# Patient Record
Sex: FEMALE | Race: BLACK OR AFRICAN AMERICAN | NOT HISPANIC OR LATINO | URBAN - METROPOLITAN AREA
[De-identification: names, ages, dates, MRNs, and addresses within clinical notes are randomized per-mention and may not be internally consistent; named-entity substitution may affect disease eponyms.]

---

## 2017-05-15 ENCOUNTER — EMERGENCY (EMERGENCY)
Facility: HOSPITAL | Age: 37
LOS: 0 days | Discharge: HOME | End: 2017-05-15
Attending: EMERGENCY MEDICINE | Admitting: EMERGENCY MEDICINE
Payer: COMMERCIAL

## 2017-05-15 VITALS
SYSTOLIC BLOOD PRESSURE: 122 MMHG | OXYGEN SATURATION: 100 % | HEART RATE: 79 BPM | TEMPERATURE: 98 F | DIASTOLIC BLOOD PRESSURE: 85 MMHG | RESPIRATION RATE: 18 BRPM | WEIGHT: 119.93 LBS

## 2017-05-15 VITALS
HEART RATE: 80 BPM | OXYGEN SATURATION: 100 % | DIASTOLIC BLOOD PRESSURE: 70 MMHG | SYSTOLIC BLOOD PRESSURE: 110 MMHG | RESPIRATION RATE: 18 BRPM

## 2017-05-15 DIAGNOSIS — D17.23 BENIGN LIPOMATOUS NEOPLASM OF SKIN AND SUBCUTANEOUS TISSUE OF RIGHT LEG: ICD-10-CM

## 2017-05-15 DIAGNOSIS — M79.662 PAIN IN LEFT LOWER LEG: ICD-10-CM

## 2017-05-15 DIAGNOSIS — M79.669 PAIN IN UNSPECIFIED LOWER LEG: ICD-10-CM

## 2017-05-15 DIAGNOSIS — M79.1 MYALGIA: ICD-10-CM

## 2017-05-15 DIAGNOSIS — M79.661 PAIN IN RIGHT LOWER LEG: ICD-10-CM

## 2017-05-15 DIAGNOSIS — D57.1 SICKLE-CELL DISEASE WITHOUT CRISIS: ICD-10-CM

## 2017-05-15 PROCEDURE — 99283 EMERGENCY DEPT VISIT LOW MDM: CPT | Mod: 25

## 2017-05-15 PROCEDURE — 73590 X-RAY EXAM OF LOWER LEG: CPT | Mod: 26,RT

## 2017-05-15 PROCEDURE — 93970 EXTREMITY STUDY: CPT | Mod: 26

## 2017-05-15 RX ORDER — IBUPROFEN 200 MG
400 TABLET ORAL ONCE
Qty: 0 | Refills: 0 | Status: COMPLETED | OUTPATIENT
Start: 2017-05-15 | End: 2017-05-15

## 2017-05-15 RX ADMIN — Medication 400 MILLIGRAM(S): at 04:01

## 2017-05-15 NOTE — ED PROVIDER NOTE - MEDICAL DECISION MAKING DETAILS
Patient to follow up with PMD/sickle cell doctor and was given copies of her ultrasound.  If pain persists, she understands she will need additional testing.

## 2017-05-15 NOTE — ED PROVIDER NOTE - DETAILS:
I, Sarah Vasquez, performed the initial face to face bedside interview with this patient regarding history of present illness, review of symptoms and relevant past medical, social and family history.  I completed an independent physical examination.  I was the initial provider who evaluated this patient.  The history, relevant review of systems, past medical and surgical history, medical decision making, and physical examination was documented by the scribe in my presence and I attest to the accuracy of the documentation.

## 2017-05-15 NOTE — ED PROVIDER NOTE - NS ED MD SCRIBE ATTENDING SCRIBE SECTIONS
HISTORY OF PRESENT ILLNESS/DISPOSITION/RESULTS/PHYSICAL EXAM/PROGRESS NOTE/REVIEW OF SYSTEMS/PAST MEDICAL/SURGICAL/SOCIAL HISTORY/VITAL SIGNS( Pullset)

## 2017-05-15 NOTE — ED PROVIDER NOTE - MUSCULOSKELETAL, MLM
Spine appears normal, range of motion is not limited. +tenderness to medial side of proximal tibia. Calf is soft without any swelling or tenderness.

## 2017-05-15 NOTE — ED PROVIDER NOTE - OBJECTIVE STATEMENT
38 y/o female with PMHx of sickle cell disease presents to the ED c/o right calf pain x 1 days. Pain is to medial side of right calf and palpates a focal tender nodule. No discoloration or swelling. Denies any falls or trauma to legs. Usually wears supportive footwear daily. Has never had this pain in the asthma. No pain meds PTA. Came to ED to r/o blood clot.

## 2017-05-15 NOTE — ED PROVIDER NOTE - CARE PLAN
Principal Discharge DX:	Myalgia  Secondary Diagnosis:	Calf pain, unspecified laterality  Secondary Diagnosis:	Lipoma of right lower extremity

## 2017-05-15 NOTE — ED ADULT NURSE NOTE - OBJECTIVE STATEMENT
38 y/o female awake alert and oriented x4 presents to ED c/o right calf pain started 1 week ago. Denies cp,sob,ha,dz,n/v/d/fever/chills or urinary sx. pt describes as burning sensation worse with touching. A bump noted on Right calf painful to touch. On birth control. non-smoker. 38 y/o female awake alert and oriented x4 presents to ED c/o right calf pain started 1 week ago. Denies cp,sob,ha,dz,n/v/d/fever/chills or urinary sx. pt describes as burning sensation worse with touching. a focal tender nodule on Right calf noted with pain to touch. On birth control. non-smoker. No Pain medication pta.

## 2017-08-21 ENCOUNTER — EMERGENCY (EMERGENCY)
Facility: HOSPITAL | Age: 37
LOS: 0 days | Discharge: HOME | End: 2017-08-21
Attending: EMERGENCY MEDICINE | Admitting: EMERGENCY MEDICINE
Payer: COMMERCIAL

## 2017-08-21 VITALS
TEMPERATURE: 98 F | SYSTOLIC BLOOD PRESSURE: 107 MMHG | RESPIRATION RATE: 16 BRPM | HEART RATE: 76 BPM | DIASTOLIC BLOOD PRESSURE: 75 MMHG | OXYGEN SATURATION: 100 %

## 2017-08-21 VITALS — HEIGHT: 55 IN | WEIGHT: 119.93 LBS

## 2017-08-21 DIAGNOSIS — S92.534A NONDISPLACED FRACTURE OF DISTAL PHALANX OF RIGHT LESSER TOE(S), INITIAL ENCOUNTER FOR CLOSED FRACTURE: ICD-10-CM
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
## 2017-08-21 DIAGNOSIS — S92.591A OTHER FRACTURE OF RIGHT LESSER TOE(S), INITIAL ENCOUNTER FOR CLOSED FRACTURE: ICD-10-CM
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
## 2017-08-21 PROCEDURE — 99283 EMERGENCY DEPT VISIT LOW MDM: CPT

## 2017-08-21 PROCEDURE — 73660 X-RAY EXAM OF TOE(S): CPT | Mod: 26,RT

## 2017-08-21 NOTE — ED STATDOCS - CARE PLAN
Principal Discharge DX:	Closed nondisplaced fracture of middle phalanx of lesser toe of right foot, initial encounter

## 2017-08-21 NOTE — ED STATDOCS - MEDICAL DECISION MAKING DETAILS
38 yo female presents with right 5th toe pain since yesterday after trauma. Xray. r/o fracture. -Mirza Astorga PA-C

## 2017-08-21 NOTE — ED STATDOCS - OBJECTIVE STATEMENT
36 y/o F with PMHx of sickle cell disease presents to the ED c/o right toe pain. Pt hurt her toe yesterday when her toe struck against the bottom of the pool. Denies any numbness or tingling. Pt's last crisis with sickle cell was in 2000. Pt does not take any meds. Non smoker.

## 2017-08-21 NOTE — ED ADULT NURSE NOTE - OBJECTIVE STATEMENT
Pt is a 37y female, A & O x 3, VSS, presents to ED s/p right foot injury, pt states she was at a water park on Saturday and hit foot in pool, pt able to ambulate, + pulse, motor and sensation, +5th digit swelling, pt c/o tingling no obvious bleeding, deformity, pt in no apparent distress, will continue to monitor.

## 2017-08-21 NOTE — ED STATDOCS - PROGRESS NOTE DETAILS
36 yo female presents with right 5th toe pain s/p hitting toe against the bottom of a pool at a water park. Denies fever, numbness. -Mirza Astorga PA-C

## 2017-09-11 ENCOUNTER — APPOINTMENT (OUTPATIENT)
Dept: OBGYN | Facility: CLINIC | Age: 37
End: 2017-09-11
Payer: COMMERCIAL

## 2017-09-11 VITALS
SYSTOLIC BLOOD PRESSURE: 114 MMHG | WEIGHT: 116 LBS | HEIGHT: 60 IN | DIASTOLIC BLOOD PRESSURE: 73 MMHG | BODY MASS INDEX: 22.78 KG/M2 | HEART RATE: 101 BPM

## 2017-09-11 LAB
BILIRUB UR QL STRIP: NORMAL
GLUCOSE UR-MCNC: NORMAL
HCG UR QL: 0.2 EU/DL
HGB UR QL STRIP.AUTO: NORMAL
KETONES UR-MCNC: NORMAL
LEUKOCYTE ESTERASE UR QL STRIP: NORMAL
NITRITE UR QL STRIP: NORMAL
PH UR STRIP: 6.5
PROT UR STRIP-MCNC: NORMAL
SP GR UR STRIP: 1.01

## 2017-09-11 PROCEDURE — 81003 URINALYSIS AUTO W/O SCOPE: CPT | Mod: QW

## 2017-09-11 PROCEDURE — 99395 PREV VISIT EST AGE 18-39: CPT

## 2017-09-12 ENCOUNTER — APPOINTMENT (OUTPATIENT)
Dept: ORTHOPEDIC SURGERY | Facility: CLINIC | Age: 37
End: 2017-09-12
Payer: COMMERCIAL

## 2017-09-12 LAB — HPV HIGH+LOW RISK DNA PNL CVX: NEGATIVE

## 2017-09-15 ENCOUNTER — EMERGENCY (EMERGENCY)
Facility: HOSPITAL | Age: 37
LOS: 0 days | Discharge: HOME | End: 2017-09-15
Attending: EMERGENCY MEDICINE | Admitting: EMERGENCY MEDICINE
Payer: COMMERCIAL

## 2017-09-15 VITALS
DIASTOLIC BLOOD PRESSURE: 83 MMHG | RESPIRATION RATE: 15 BRPM | OXYGEN SATURATION: 100 % | TEMPERATURE: 99 F | SYSTOLIC BLOOD PRESSURE: 118 MMHG | WEIGHT: 149.91 LBS | HEIGHT: 64 IN | HEART RATE: 74 BPM

## 2017-09-15 VITALS
TEMPERATURE: 98 F | HEART RATE: 69 BPM | SYSTOLIC BLOOD PRESSURE: 126 MMHG | DIASTOLIC BLOOD PRESSURE: 73 MMHG | OXYGEN SATURATION: 99 % | RESPIRATION RATE: 16 BRPM

## 2017-09-15 DIAGNOSIS — D57.1 SICKLE-CELL DISEASE WITHOUT CRISIS: ICD-10-CM

## 2017-09-15 DIAGNOSIS — N39.0 URINARY TRACT INFECTION, SITE NOT SPECIFIED: ICD-10-CM

## 2017-09-15 LAB
APPEARANCE UR: (no result)
BACTERIA # UR AUTO: (no result)
BACTERIA UR CULT: ABNORMAL
BILIRUB UR-MCNC: NEGATIVE — SIGNIFICANT CHANGE UP
COLOR SPEC: YELLOW — SIGNIFICANT CHANGE UP
CYTOLOGY CVX/VAG DOC THIN PREP: NORMAL
DIFF PNL FLD: (no result)
EPI CELLS # UR: SIGNIFICANT CHANGE UP
GLUCOSE UR QL: NEGATIVE MG/DL — SIGNIFICANT CHANGE UP
KETONES UR-MCNC: NEGATIVE — SIGNIFICANT CHANGE UP
LEUKOCYTE ESTERASE UR-ACNC: (no result)
NITRITE UR-MCNC: NEGATIVE — SIGNIFICANT CHANGE UP
PH UR: 8 — SIGNIFICANT CHANGE UP (ref 5–8)
PROT UR-MCNC: NEGATIVE MG/DL — SIGNIFICANT CHANGE UP
RBC CASTS # UR COMP ASSIST: SIGNIFICANT CHANGE UP /HPF (ref 0–4)
SP GR SPEC: 1.01 — SIGNIFICANT CHANGE UP (ref 1.01–1.02)
UROBILINOGEN FLD QL: 4 MG/DL
WBC UR QL: SIGNIFICANT CHANGE UP

## 2017-09-15 PROCEDURE — 99283 EMERGENCY DEPT VISIT LOW MDM: CPT

## 2017-09-15 RX ORDER — CIPROFLOXACIN LACTATE 400MG/40ML
500 VIAL (ML) INTRAVENOUS ONCE
Qty: 0 | Refills: 0 | Status: COMPLETED | OUTPATIENT
Start: 2017-09-15 | End: 2017-09-15

## 2017-09-15 RX ORDER — NITROFURANTOIN MACROCRYSTALS 100 MG/1
100 CAPSULE ORAL
Qty: 14 | Refills: 0 | Status: DISCONTINUED | COMMUNITY
Start: 2017-09-14 | End: 2017-09-15

## 2017-09-15 RX ADMIN — Medication 500 MILLIGRAM(S): at 22:44

## 2017-09-15 NOTE — ED PROVIDER NOTE - OBJECTIVE STATEMENT
36 yo F with hx of sickle cell disease presents for evaluation of UTI symptoms. patient describes urinary frequency, urgency and dysuria x4 days. patient prescribed macrobid by PMD yesterday. first dose last night. this morning she describes headache which she believes is secondary to macrobid. PMD changed medication to cipro but she has not filled prescription yet. no fever or chills.

## 2017-09-15 NOTE — ED PROVIDER NOTE - GASTROINTESTINAL NEGATIVE STATEMENT, MLM
no abdominal pain, no bloating, no constipation, no diarrhea, no nausea and no vomiting. no CVA TTP.

## 2017-09-15 NOTE — ED ADULT NURSE NOTE - CHIEF COMPLAINT QUOTE
uti symptoms since last week. started on macrobid, worsening symptoms, gyn changed medication to cipro, pt requesting 2nd opinion before starting cipro.

## 2017-09-15 NOTE — ED ADULT NURSE NOTE - OBJECTIVE STATEMENT
Patient arrived to ED c/o urinary symptoms, dysuria with urgency and back pain. Given Macrobid, took a dose yesterday but had migraine headache, nausea and vomiting, contacted PCP, changed PO med to cipro but did not take med because wanted a second opinion. Hx of sickle cell.

## 2017-09-15 NOTE — ED PROVIDER NOTE - MEDICAL DECISION MAKING DETAILS
patient with history suggestive of UTI despite low WBC in urine. patient given dose of cipro in ED, she has a prescription for Cipro waiting for her at her pharmacy prescribed by her PMD. precautions when to return to the ED given and understood.

## 2017-09-17 LAB
CULTURE RESULTS: NO GROWTH — SIGNIFICANT CHANGE UP
SPECIMEN SOURCE: SIGNIFICANT CHANGE UP

## 2017-09-19 ENCOUNTER — APPOINTMENT (OUTPATIENT)
Dept: INTERNAL MEDICINE | Facility: CLINIC | Age: 37
End: 2017-09-19
Payer: COMMERCIAL

## 2017-09-19 VITALS
BODY MASS INDEX: 20.14 KG/M2 | HEART RATE: 94 BPM | TEMPERATURE: 98.7 F | SYSTOLIC BLOOD PRESSURE: 108 MMHG | DIASTOLIC BLOOD PRESSURE: 60 MMHG | OXYGEN SATURATION: 97 % | WEIGHT: 118 LBS | RESPIRATION RATE: 16 BRPM | HEIGHT: 64 IN

## 2017-09-19 DIAGNOSIS — Z82.49 FAMILY HISTORY OF ISCHEMIC HEART DISEASE AND OTHER DISEASES OF THE CIRCULATORY SYSTEM: ICD-10-CM

## 2017-09-19 PROCEDURE — 99204 OFFICE O/P NEW MOD 45 MIN: CPT

## 2017-09-20 ENCOUNTER — APPOINTMENT (OUTPATIENT)
Dept: ORTHOPEDIC SURGERY | Facility: CLINIC | Age: 37
End: 2017-09-20
Payer: COMMERCIAL

## 2017-09-20 DIAGNOSIS — M25.552 PAIN IN RIGHT HIP: ICD-10-CM

## 2017-09-20 DIAGNOSIS — M25.551 PAIN IN RIGHT HIP: ICD-10-CM

## 2017-09-20 PROCEDURE — 99204 OFFICE O/P NEW MOD 45 MIN: CPT | Mod: 57

## 2017-09-20 PROCEDURE — 73630 X-RAY EXAM OF FOOT: CPT | Mod: RT

## 2017-09-20 PROCEDURE — 72190 X-RAY EXAM OF PELVIS: CPT

## 2017-09-20 PROCEDURE — 28510 TREATMENT OF TOE FRACTURE: CPT | Mod: T9

## 2017-09-24 PROBLEM — M25.551 HIP PAIN, BILATERAL: Status: ACTIVE | Noted: 2017-09-19

## 2017-11-01 ENCOUNTER — APPOINTMENT (OUTPATIENT)
Dept: ORTHOPEDIC SURGERY | Facility: CLINIC | Age: 37
End: 2017-11-01
Payer: COMMERCIAL

## 2017-11-01 PROCEDURE — 73630 X-RAY EXAM OF FOOT: CPT | Mod: RT

## 2017-11-01 PROCEDURE — 99024 POSTOP FOLLOW-UP VISIT: CPT

## 2018-01-23 ENCOUNTER — OTHER (OUTPATIENT)
Age: 38
End: 2018-01-23

## 2018-01-23 LAB
CHOLEST SERPL-MCNC: 152
GLUCOSE BS SERPL-MCNC: 84
HBA1C MFR BLD HPLC: 4
HDLC SERPL-MCNC: 43
LDLC SERPL CALC-MCNC: 91

## 2018-02-09 ENCOUNTER — EMERGENCY (EMERGENCY)
Facility: HOSPITAL | Age: 38
LOS: 0 days | Discharge: HOME | End: 2018-02-10
Attending: EMERGENCY MEDICINE | Admitting: EMERGENCY MEDICINE
Payer: COMMERCIAL

## 2018-02-09 VITALS — HEIGHT: 64 IN | WEIGHT: 115.08 LBS

## 2018-02-09 DIAGNOSIS — J40 BRONCHITIS, NOT SPECIFIED AS ACUTE OR CHRONIC: ICD-10-CM

## 2018-02-09 DIAGNOSIS — R05 COUGH: ICD-10-CM

## 2018-02-09 DIAGNOSIS — D57.1 SICKLE-CELL DISEASE WITHOUT CRISIS: ICD-10-CM

## 2018-02-09 PROCEDURE — 99285 EMERGENCY DEPT VISIT HI MDM: CPT | Mod: 25

## 2018-02-09 NOTE — ED ADULT NURSE NOTE - OBJECTIVE STATEMENT
Pt states she has had cough with yellow sputum, fever up to 102 and general malaise since Wednesday.  States hx of sickle cell and recurrent PNA.  She has been taking advil for fever control.

## 2018-02-10 VITALS
RESPIRATION RATE: 17 BRPM | HEART RATE: 98 BPM | SYSTOLIC BLOOD PRESSURE: 107 MMHG | DIASTOLIC BLOOD PRESSURE: 60 MMHG | TEMPERATURE: 99 F | OXYGEN SATURATION: 100 %

## 2018-02-10 LAB
ALBUMIN SERPL ELPH-MCNC: 3.9 G/DL — SIGNIFICANT CHANGE UP (ref 3.3–5)
ALP SERPL-CCNC: 78 U/L — SIGNIFICANT CHANGE UP (ref 40–120)
ALT FLD-CCNC: 16 U/L — SIGNIFICANT CHANGE UP (ref 12–78)
ANION GAP SERPL CALC-SCNC: 5 MMOL/L — SIGNIFICANT CHANGE UP (ref 5–17)
AST SERPL-CCNC: 25 U/L — SIGNIFICANT CHANGE UP (ref 15–37)
BASOPHILS # BLD AUTO: 0.1 K/UL — SIGNIFICANT CHANGE UP (ref 0–0.2)
BASOPHILS NFR BLD AUTO: 1.1 % — SIGNIFICANT CHANGE UP (ref 0–2)
BILIRUB SERPL-MCNC: 1.4 MG/DL — HIGH (ref 0.2–1.2)
BLD GP AB SCN SERPL QL: SIGNIFICANT CHANGE UP
BUN SERPL-MCNC: 5 MG/DL — LOW (ref 7–23)
CALCIUM SERPL-MCNC: 8.8 MG/DL — SIGNIFICANT CHANGE UP (ref 8.5–10.1)
CHLORIDE SERPL-SCNC: 101 MMOL/L — SIGNIFICANT CHANGE UP (ref 96–108)
CO2 SERPL-SCNC: 30 MMOL/L — SIGNIFICANT CHANGE UP (ref 22–31)
CREAT SERPL-MCNC: 0.67 MG/DL — SIGNIFICANT CHANGE UP (ref 0.5–1.3)
EOSINOPHIL # BLD AUTO: 0 K/UL — SIGNIFICANT CHANGE UP (ref 0–0.5)
EOSINOPHIL NFR BLD AUTO: 0.4 % — SIGNIFICANT CHANGE UP (ref 0–6)
GLUCOSE SERPL-MCNC: 88 MG/DL — SIGNIFICANT CHANGE UP (ref 70–99)
HCG SERPL-ACNC: <1 MIU/ML — SIGNIFICANT CHANGE UP
HCT VFR BLD CALC: 31.7 % — LOW (ref 34.5–45)
HGB BLD-MCNC: 11.5 G/DL — SIGNIFICANT CHANGE UP (ref 11.5–15.5)
LYMPHOCYTES # BLD AUTO: 2.6 K/UL — SIGNIFICANT CHANGE UP (ref 1–3.3)
LYMPHOCYTES # BLD AUTO: 35.2 % — SIGNIFICANT CHANGE UP (ref 13–44)
MANUAL DIF COMMENT BLD-IMP: SIGNIFICANT CHANGE UP
MCHC RBC-ENTMCNC: 31.6 PG — SIGNIFICANT CHANGE UP (ref 27–34)
MCHC RBC-ENTMCNC: 36.3 GM/DL — HIGH (ref 32–36)
MCV RBC AUTO: 87 FL — SIGNIFICANT CHANGE UP (ref 80–100)
MONOCYTES # BLD AUTO: 0.6 K/UL — SIGNIFICANT CHANGE UP (ref 0–0.9)
MONOCYTES NFR BLD AUTO: 8.2 % — SIGNIFICANT CHANGE UP (ref 2–14)
NEUTROPHILS # BLD AUTO: 4.1 K/UL — SIGNIFICANT CHANGE UP (ref 1.8–7.4)
NEUTROPHILS NFR BLD AUTO: 55.1 % — SIGNIFICANT CHANGE UP (ref 43–77)
PLAT MORPH BLD: NORMAL — SIGNIFICANT CHANGE UP
PLATELET # BLD AUTO: 272 K/UL — SIGNIFICANT CHANGE UP (ref 150–400)
POTASSIUM SERPL-MCNC: 4 MMOL/L — SIGNIFICANT CHANGE UP (ref 3.5–5.3)
POTASSIUM SERPL-SCNC: 4 MMOL/L — SIGNIFICANT CHANGE UP (ref 3.5–5.3)
PROT SERPL-MCNC: 9.5 GM/DL — HIGH (ref 6–8.3)
RBC # BLD: 3.64 M/UL — LOW (ref 3.8–5.2)
RBC # BLD: 3.67 M/UL — LOW (ref 3.8–5.2)
RBC # FLD: 12.5 % — SIGNIFICANT CHANGE UP (ref 10.3–14.5)
RBC BLD AUTO: (no result)
RETICS #: 123.3 K/UL — SIGNIFICANT CHANGE UP (ref 25–125)
RETICS/RBC NFR: 3.4 % — HIGH (ref 0.5–2.5)
SODIUM SERPL-SCNC: 136 MMOL/L — SIGNIFICANT CHANGE UP (ref 135–145)
TARGETS BLD QL SMEAR: SLIGHT — SIGNIFICANT CHANGE UP
TROPONIN I SERPL-MCNC: <0.015 NG/ML — SIGNIFICANT CHANGE UP (ref 0.01–0.04)
TYPE + AB SCN PNL BLD: SIGNIFICANT CHANGE UP
WBC # BLD: 7.5 K/UL — SIGNIFICANT CHANGE UP (ref 3.8–10.5)
WBC # FLD AUTO: 7.5 K/UL — SIGNIFICANT CHANGE UP (ref 3.8–10.5)

## 2018-02-10 PROCEDURE — 71046 X-RAY EXAM CHEST 2 VIEWS: CPT | Mod: 26

## 2018-02-10 PROCEDURE — 93010 ELECTROCARDIOGRAM REPORT: CPT

## 2018-02-10 RX ORDER — ALBUTEROL 90 UG/1
1 AEROSOL, METERED ORAL
Qty: 1 | Refills: 0
Start: 2018-02-10 | End: 2018-02-14

## 2018-02-10 RX ORDER — ALBUTEROL 90 UG/1
2.5 AEROSOL, METERED ORAL ONCE
Qty: 0 | Refills: 0 | Status: COMPLETED | OUTPATIENT
Start: 2018-02-10 | End: 2018-02-10

## 2018-02-10 RX ORDER — SODIUM CHLORIDE 9 MG/ML
1000 INJECTION INTRAMUSCULAR; INTRAVENOUS; SUBCUTANEOUS ONCE
Qty: 0 | Refills: 0 | Status: COMPLETED | OUTPATIENT
Start: 2018-02-10 | End: 2018-02-10

## 2018-02-10 RX ADMIN — SODIUM CHLORIDE 2000 MILLILITER(S): 9 INJECTION INTRAMUSCULAR; INTRAVENOUS; SUBCUTANEOUS at 00:05

## 2018-02-10 RX ADMIN — ALBUTEROL 2.5 MILLIGRAM(S): 90 AEROSOL, METERED ORAL at 00:20

## 2018-02-10 NOTE — ED PROVIDER NOTE - CARE PLAN
Principal Discharge DX:	Bronchitis Principal Discharge DX:	Bronchitis  Secondary Diagnosis:	Sickle cell disease

## 2018-02-10 NOTE — ED PROVIDER NOTE - MEDICAL DECISION MAKING DETAILS
feeling better ny hypoxemia will tx vioral bronchitis return to ed for intracble chest pain sob or any overall worsning

## 2018-02-10 NOTE — ED PROVIDER NOTE - OBJECTIVE STATEMENT
pt presents with non productive cough myalgia and subjective fever. + h/o SCD. denies fever. denies HA or neck pain. no chest pain + sob. no abd pain. no n/v/d. no urinary f/u/d. no back pain. no motor or sensory deficits. denies illicit drug use. no recent travel. no rash. no other acute issues symptoms or concerns . no leg swelling no hemoptysis

## 2018-02-12 NOTE — ED POST DISCHARGE NOTE - REASON FOR FOLLOW-UP
Other Contacted patient by phone with results of lab work (reticulocyte percent). patient will F/U with PCP. Bright HANDLEY

## 2018-03-06 ENCOUNTER — APPOINTMENT (OUTPATIENT)
Dept: INTERNAL MEDICINE | Facility: CLINIC | Age: 38
End: 2018-03-06
Payer: COMMERCIAL

## 2018-03-06 VITALS
WEIGHT: 112 LBS | BODY MASS INDEX: 19.12 KG/M2 | HEIGHT: 64 IN | OXYGEN SATURATION: 97 % | RESPIRATION RATE: 16 BRPM | DIASTOLIC BLOOD PRESSURE: 62 MMHG | HEART RATE: 92 BPM | TEMPERATURE: 98.8 F | SYSTOLIC BLOOD PRESSURE: 104 MMHG

## 2018-03-06 DIAGNOSIS — M87.059 IDIOPATHIC ASEPTIC NECROSIS OF UNSPECIFIED FEMUR: ICD-10-CM

## 2018-03-06 DIAGNOSIS — M15.9 POLYOSTEOARTHRITIS, UNSPECIFIED: ICD-10-CM

## 2018-03-06 PROCEDURE — 99214 OFFICE O/P EST MOD 30 MIN: CPT

## 2018-03-06 RX ORDER — VIT A AND D3 IN COD LIVER OIL 1250-135
CAPSULE ORAL
Refills: 0 | Status: ACTIVE | COMMUNITY

## 2018-04-26 ENCOUNTER — APPOINTMENT (OUTPATIENT)
Dept: INTERNAL MEDICINE | Facility: CLINIC | Age: 38
End: 2018-04-26
Payer: COMMERCIAL

## 2018-04-26 VITALS
OXYGEN SATURATION: 95 % | SYSTOLIC BLOOD PRESSURE: 106 MMHG | HEART RATE: 88 BPM | RESPIRATION RATE: 14 BRPM | WEIGHT: 112 LBS | TEMPERATURE: 98.4 F | DIASTOLIC BLOOD PRESSURE: 70 MMHG | BODY MASS INDEX: 21.99 KG/M2 | HEIGHT: 60 IN

## 2018-04-26 PROCEDURE — 81002 URINALYSIS NONAUTO W/O SCOPE: CPT

## 2018-04-26 PROCEDURE — 99213 OFFICE O/P EST LOW 20 MIN: CPT | Mod: 25

## 2018-04-30 LAB
APPEARANCE: CLEAR
BACTERIA UR CULT: ABNORMAL
BACTERIA: NEGATIVE
BILIRUBIN URINE: NEGATIVE
BLOOD URINE: NEGATIVE
COLOR: YELLOW
GLUCOSE QUALITATIVE U: NEGATIVE MG/DL
HYALINE CASTS: 0 /LPF
KETONES URINE: NEGATIVE
LEUKOCYTE ESTERASE URINE: ABNORMAL
MICROSCOPIC-UA: NORMAL
NITRITE URINE: NEGATIVE
PH URINE: 8
PROTEIN URINE: 30 MG/DL
RED BLOOD CELLS URINE: 4 /HPF
SPECIFIC GRAVITY URINE: 1.01
SQUAMOUS EPITHELIAL CELLS: 2 /HPF
UROBILINOGEN URINE: 1 MG/DL
WHITE BLOOD CELLS URINE: 128 /HPF

## 2018-09-04 ENCOUNTER — APPOINTMENT (OUTPATIENT)
Dept: INTERNAL MEDICINE | Facility: CLINIC | Age: 38
End: 2018-09-04

## 2018-09-13 ENCOUNTER — RX RENEWAL (OUTPATIENT)
Age: 38
End: 2018-09-13

## 2018-09-14 PROBLEM — D57.1 SICKLE-CELL DISEASE WITHOUT CRISIS: Chronic | Status: ACTIVE | Noted: 2017-05-15

## 2018-09-28 ENCOUNTER — APPOINTMENT (OUTPATIENT)
Dept: OBGYN | Facility: CLINIC | Age: 38
End: 2018-09-28
Payer: COMMERCIAL

## 2018-09-28 VITALS
WEIGHT: 115.5 LBS | BODY MASS INDEX: 22.68 KG/M2 | SYSTOLIC BLOOD PRESSURE: 115 MMHG | HEART RATE: 83 BPM | HEIGHT: 60 IN | DIASTOLIC BLOOD PRESSURE: 77 MMHG

## 2018-09-28 DIAGNOSIS — Z87.898 PERSONAL HISTORY OF OTHER SPECIFIED CONDITIONS: ICD-10-CM

## 2018-09-28 DIAGNOSIS — S92.911A UNSPECIFIED FRACTURE OF RIGHT TOE(S), INITIAL ENCOUNTER FOR CLOSED FRACTURE: ICD-10-CM

## 2018-09-28 PROCEDURE — 99396 PREV VISIT EST AGE 40-64: CPT

## 2018-09-28 RX ORDER — CIPROFLOXACIN HYDROCHLORIDE 250 MG/1
250 TABLET, FILM COATED ORAL
Qty: 14 | Refills: 0 | Status: COMPLETED | COMMUNITY
Start: 2018-04-26 | End: 2018-09-28

## 2018-09-30 LAB — HPV HIGH+LOW RISK DNA PNL CVX: NOT DETECTED

## 2018-10-03 LAB — CYTOLOGY CVX/VAG DOC THIN PREP: NORMAL

## 2019-03-05 NOTE — ED PROVIDER NOTE - NS ED NOTE AC HIGH RISK COUNTRIES
Left message informing patient she is due for fasting labs.  Advised to fast for 12 hours.  Informed patient she can visit any [facility] labs to have labs completed; if planning to come to our office for labs to call and schedule lab appt.  Lab orders extended 2 weeks.   
No

## 2019-05-08 NOTE — ED ADULT TRIAGE NOTE - CHIEF COMPLAINT QUOTE
syncope at work uti symptoms since last week. started on macrobid, worsening symptoms, gyn changed medication to cipro, pt requesting 2nd opinion before starting cipro.

## 2019-06-16 ENCOUNTER — FORM ENCOUNTER (OUTPATIENT)
Age: 39
End: 2019-06-16

## 2019-06-17 ENCOUNTER — OUTPATIENT (OUTPATIENT)
Dept: OUTPATIENT SERVICES | Facility: HOSPITAL | Age: 39
LOS: 1 days | End: 2019-06-17
Payer: COMMERCIAL

## 2019-06-17 ENCOUNTER — APPOINTMENT (OUTPATIENT)
Dept: RADIOLOGY | Facility: CLINIC | Age: 39
End: 2019-06-17
Payer: COMMERCIAL

## 2019-06-17 ENCOUNTER — APPOINTMENT (OUTPATIENT)
Dept: INTERNAL MEDICINE | Facility: CLINIC | Age: 39
End: 2019-06-17
Payer: COMMERCIAL

## 2019-06-17 ENCOUNTER — NON-APPOINTMENT (OUTPATIENT)
Age: 39
End: 2019-06-17

## 2019-06-17 VITALS
HEIGHT: 64 IN | BODY MASS INDEX: 19.29 KG/M2 | TEMPERATURE: 98.8 F | HEART RATE: 70 BPM | SYSTOLIC BLOOD PRESSURE: 96 MMHG | WEIGHT: 113 LBS | OXYGEN SATURATION: 97 % | RESPIRATION RATE: 18 BRPM | DIASTOLIC BLOOD PRESSURE: 64 MMHG

## 2019-06-17 DIAGNOSIS — Z01.419 ENCOUNTER FOR GYNECOLOGICAL EXAMINATION (GENERAL) (ROUTINE) W/OUT ABNORMAL FINDINGS: ICD-10-CM

## 2019-06-17 DIAGNOSIS — B35.4 TINEA CORPORIS: ICD-10-CM

## 2019-06-17 DIAGNOSIS — R07.9 CHEST PAIN, UNSPECIFIED: ICD-10-CM

## 2019-06-17 DIAGNOSIS — D57.1 SICKLE-CELL DISEASE W/OUT CRISIS: ICD-10-CM

## 2019-06-17 DIAGNOSIS — Z00.8 ENCOUNTER FOR OTHER GENERAL EXAMINATION: ICD-10-CM

## 2019-06-17 PROCEDURE — 93000 ELECTROCARDIOGRAM COMPLETE: CPT

## 2019-06-17 PROCEDURE — 99214 OFFICE O/P EST MOD 30 MIN: CPT | Mod: 25

## 2019-06-17 PROCEDURE — 71046 X-RAY EXAM CHEST 2 VIEWS: CPT

## 2019-06-17 PROCEDURE — 71046 X-RAY EXAM CHEST 2 VIEWS: CPT | Mod: 26

## 2019-06-17 RX ORDER — MULTIVITAMIN
CAPSULE ORAL
Refills: 0 | Status: COMPLETED | COMMUNITY
End: 2019-06-17

## 2019-06-17 NOTE — ASSESSMENT
[FreeTextEntry1] : CRXY PA/LAT stat , [facility] r/o infiltrate\par start Levaquin 500 mg po daily x 7 days\par Stressed to pt to Maintain hydration , h/ of sickle cell  \par OTC analgesia for aches, pain\par F/up , hematology  \par pt given note for work excuse \par To ER if symptoms worsen

## 2019-06-17 NOTE — REVIEW OF SYSTEMS
[Negative] : Psychiatric [Fatigue] : fatigue [Chest Pain] : chest pain [Cough] : cough [Fever] : no fever [Wheezing] : no wheezing [Chills] : no chills [Dyspnea on Exertion] : no dyspnea on exertion [FreeTextEntry6] : see HPI

## 2019-06-17 NOTE — HISTORY OF PRESENT ILLNESS
[FreeTextEntry8] : Pt presents  to the office today with complaints of  pleuritic chest pain and green colored sputum x 4 days. This is  associated with an occasional cough . She denies fever, chills, shortness of breath or wheezing. She is a non smoker, ho history of asthma. \par She has a history of sickle cell anemia and sees Dr. Garcia. hematology at [facility] . She reports her last sickle cell crisis was in 2000 requiring hospitalization. \par

## 2019-06-17 NOTE — PHYSICAL EXAM
[No Acute Distress] : no acute distress [Well Nourished] : well nourished [Well Developed] : well developed [Normal Oropharynx] : the oropharynx was normal [Normal TMs] : both tympanic membranes were normal [Supple] : supple [No Lymphadenopathy] : no lymphadenopathy [No Respiratory Distress] : no respiratory distress  [Clear to Auscultation] : lungs were clear to auscultation bilaterally [No Accessory Muscle Use] : no accessory muscle use [Normal Rate] : normal rate  [Regular Rhythm] : with a regular rhythm [Normal S1, S2] : normal S1 and S2 [Normal Posterior Cervical Nodes] : no posterior cervical lymphadenopathy [Normal Anterior Cervical Nodes] : no anterior cervical lymphadenopathy [Normal Gait] : normal gait [No Focal Deficits] : no focal deficits [Coordination Grossly Intact] : coordination grossly intact [Normal Affect] : the affect was normal [Alert and Oriented x3] : oriented to person, place, and time [Normal Insight/Judgement] : insight and judgment were intact

## 2019-08-26 ENCOUNTER — APPOINTMENT (OUTPATIENT)
Dept: INTERNAL MEDICINE | Facility: CLINIC | Age: 39
End: 2019-08-26
Payer: COMMERCIAL

## 2019-08-26 ENCOUNTER — INPATIENT (INPATIENT)
Facility: HOSPITAL | Age: 39
LOS: 2 days | Discharge: HOME | DRG: 871 | End: 2019-08-29
Attending: FAMILY MEDICINE | Admitting: HOSPITALIST
Payer: COMMERCIAL

## 2019-08-26 VITALS
HEART RATE: 122 BPM | SYSTOLIC BLOOD PRESSURE: 90 MMHG | TEMPERATURE: 100.7 F | HEIGHT: 64 IN | DIASTOLIC BLOOD PRESSURE: 60 MMHG | OXYGEN SATURATION: 98 % | WEIGHT: 112 LBS | BODY MASS INDEX: 19.12 KG/M2 | RESPIRATION RATE: 18 BRPM

## 2019-08-26 VITALS — WEIGHT: 115.08 LBS

## 2019-08-26 DIAGNOSIS — R39.9 UNSPECIFIED SYMPTOMS AND SIGNS INVOLVING THE GENITOURINARY SYSTEM: ICD-10-CM

## 2019-08-26 DIAGNOSIS — R10.9 UNSPECIFIED ABDOMINAL PAIN: ICD-10-CM

## 2019-08-26 LAB
ALBUMIN SERPL ELPH-MCNC: 3.9 G/DL — SIGNIFICANT CHANGE UP (ref 3.3–5)
ALP SERPL-CCNC: 72 U/L — SIGNIFICANT CHANGE UP (ref 40–120)
ALT FLD-CCNC: 16 U/L — SIGNIFICANT CHANGE UP (ref 12–78)
ANION GAP SERPL CALC-SCNC: 6 MMOL/L — SIGNIFICANT CHANGE UP (ref 5–17)
APPEARANCE UR: ABNORMAL
APTT BLD: 29.1 SEC — SIGNIFICANT CHANGE UP (ref 27.5–36.3)
AST SERPL-CCNC: 17 U/L — SIGNIFICANT CHANGE UP (ref 15–37)
BASOPHILS # BLD AUTO: 0.06 K/UL — SIGNIFICANT CHANGE UP (ref 0–0.2)
BASOPHILS NFR BLD AUTO: 0.4 % — SIGNIFICANT CHANGE UP (ref 0–2)
BILIRUB SERPL-MCNC: 2 MG/DL — HIGH (ref 0.2–1.2)
BILIRUB UR QL STRIP: NEGATIVE
BILIRUB UR-MCNC: NEGATIVE — SIGNIFICANT CHANGE UP
BUN SERPL-MCNC: 9 MG/DL — SIGNIFICANT CHANGE UP (ref 7–23)
CALCIUM SERPL-MCNC: 9.5 MG/DL — SIGNIFICANT CHANGE UP (ref 8.5–10.1)
CHLORIDE SERPL-SCNC: 104 MMOL/L — SIGNIFICANT CHANGE UP (ref 96–108)
CLARITY UR: NORMAL
CO2 SERPL-SCNC: 28 MMOL/L — SIGNIFICANT CHANGE UP (ref 22–31)
COLOR SPEC: YELLOW — SIGNIFICANT CHANGE UP
CREAT SERPL-MCNC: 0.81 MG/DL — SIGNIFICANT CHANGE UP (ref 0.5–1.3)
DIFF PNL FLD: ABNORMAL
EOSINOPHIL # BLD AUTO: 0.03 K/UL — SIGNIFICANT CHANGE UP (ref 0–0.5)
EOSINOPHIL NFR BLD AUTO: 0.2 % — SIGNIFICANT CHANGE UP (ref 0–6)
GLUCOSE SERPL-MCNC: 106 MG/DL — HIGH (ref 70–99)
GLUCOSE UR QL: NEGATIVE MG/DL — SIGNIFICANT CHANGE UP
GLUCOSE UR-MCNC: NEGATIVE
HCG SERPL-ACNC: <1 MIU/ML — SIGNIFICANT CHANGE UP
HCG UR QL: 2 EU/DL
HCT VFR BLD CALC: 29.9 % — LOW (ref 34.5–45)
HGB BLD-MCNC: 11 G/DL — LOW (ref 11.5–15.5)
HGB UR QL STRIP.AUTO: NORMAL
IMM GRANULOCYTES NFR BLD AUTO: 0.6 % — SIGNIFICANT CHANGE UP (ref 0–1.5)
INR BLD: 1.25 RATIO — HIGH (ref 0.88–1.16)
KETONES UR-MCNC: NEGATIVE
KETONES UR-MCNC: NEGATIVE — SIGNIFICANT CHANGE UP
LACTATE SERPL-SCNC: 2.7 MMOL/L — HIGH (ref 0.7–2)
LEUKOCYTE ESTERASE UR QL STRIP: NORMAL
LEUKOCYTE ESTERASE UR-ACNC: ABNORMAL
LYMPHOCYTES # BLD AUTO: 16.9 % — SIGNIFICANT CHANGE UP (ref 13–44)
LYMPHOCYTES # BLD AUTO: 2.35 K/UL — SIGNIFICANT CHANGE UP (ref 1–3.3)
MCHC RBC-ENTMCNC: 31 PG — SIGNIFICANT CHANGE UP (ref 27–34)
MCHC RBC-ENTMCNC: 36.8 GM/DL — HIGH (ref 32–36)
MCV RBC AUTO: 84.2 FL — SIGNIFICANT CHANGE UP (ref 80–100)
MONOCYTES # BLD AUTO: 0.89 K/UL — SIGNIFICANT CHANGE UP (ref 0–0.9)
MONOCYTES NFR BLD AUTO: 6.4 % — SIGNIFICANT CHANGE UP (ref 2–14)
NEUTROPHILS # BLD AUTO: 10.48 K/UL — HIGH (ref 1.8–7.4)
NEUTROPHILS NFR BLD AUTO: 75.5 % — SIGNIFICANT CHANGE UP (ref 43–77)
NITRITE UR QL STRIP: NEGATIVE
NITRITE UR-MCNC: NEGATIVE — SIGNIFICANT CHANGE UP
PH UR STRIP: 8.5
PH UR: 7 — SIGNIFICANT CHANGE UP (ref 5–8)
PLATELET # BLD AUTO: 288 K/UL — SIGNIFICANT CHANGE UP (ref 150–400)
POTASSIUM SERPL-MCNC: 3.6 MMOL/L — SIGNIFICANT CHANGE UP (ref 3.5–5.3)
POTASSIUM SERPL-SCNC: 3.6 MMOL/L — SIGNIFICANT CHANGE UP (ref 3.5–5.3)
PROT SERPL-MCNC: 8.7 GM/DL — HIGH (ref 6–8.3)
PROT UR STRIP-MCNC: NORMAL
PROT UR-MCNC: 100 MG/DL
PROTHROM AB SERPL-ACNC: 14 SEC — HIGH (ref 10–12.9)
RBC # BLD: 3.55 M/UL — LOW (ref 3.8–5.2)
RBC # FLD: 12.8 % — SIGNIFICANT CHANGE UP (ref 10.3–14.5)
SODIUM SERPL-SCNC: 138 MMOL/L — SIGNIFICANT CHANGE UP (ref 135–145)
SP GR SPEC: 1 — LOW (ref 1.01–1.02)
SP GR UR STRIP: 1.01
UROBILINOGEN FLD QL: 4 MG/DL
WBC # BLD: 13.89 K/UL — HIGH (ref 3.8–10.5)
WBC # FLD AUTO: 13.89 K/UL — HIGH (ref 3.8–10.5)

## 2019-08-26 PROCEDURE — 71045 X-RAY EXAM CHEST 1 VIEW: CPT | Mod: 26

## 2019-08-26 PROCEDURE — 81003 URINALYSIS AUTO W/O SCOPE: CPT | Mod: QW

## 2019-08-26 PROCEDURE — 99213 OFFICE O/P EST LOW 20 MIN: CPT

## 2019-08-26 PROCEDURE — 74176 CT ABD & PELVIS W/O CONTRAST: CPT | Mod: 26

## 2019-08-26 PROCEDURE — 93010 ELECTROCARDIOGRAM REPORT: CPT

## 2019-08-26 RX ORDER — CEFTRIAXONE 500 MG/1
1000 INJECTION, POWDER, FOR SOLUTION INTRAMUSCULAR; INTRAVENOUS ONCE
Refills: 0 | Status: DISCONTINUED | OUTPATIENT
Start: 2019-08-26 | End: 2019-08-26

## 2019-08-26 RX ORDER — MORPHINE SULFATE 50 MG/1
4 CAPSULE, EXTENDED RELEASE ORAL ONCE
Refills: 0 | Status: DISCONTINUED | OUTPATIENT
Start: 2019-08-26 | End: 2019-08-26

## 2019-08-26 RX ORDER — PSYLLIUM HUSK 0.4 G
CAPSULE ORAL
Refills: 0 | Status: COMPLETED | COMMUNITY
End: 2019-08-26

## 2019-08-26 RX ORDER — ACETAMINOPHEN 500 MG
1000 TABLET ORAL ONCE
Refills: 0 | Status: COMPLETED | OUTPATIENT
Start: 2019-08-26 | End: 2019-08-26

## 2019-08-26 RX ORDER — SODIUM CHLORIDE 9 MG/ML
1000 INJECTION INTRAMUSCULAR; INTRAVENOUS; SUBCUTANEOUS ONCE
Refills: 0 | Status: COMPLETED | OUTPATIENT
Start: 2019-08-26 | End: 2019-08-26

## 2019-08-26 RX ORDER — CEFTRIAXONE 500 MG/1
1000 INJECTION, POWDER, FOR SOLUTION INTRAMUSCULAR; INTRAVENOUS ONCE
Refills: 0 | Status: COMPLETED | OUTPATIENT
Start: 2019-08-26 | End: 2019-08-26

## 2019-08-26 RX ORDER — LEVOFLOXACIN 500 MG/1
500 TABLET, FILM COATED ORAL DAILY
Qty: 7 | Refills: 0 | Status: COMPLETED | COMMUNITY
Start: 2019-06-17 | End: 2019-08-26

## 2019-08-26 RX ORDER — SODIUM CHLORIDE 9 MG/ML
1600 INJECTION, SOLUTION INTRAVENOUS ONCE
Refills: 0 | Status: COMPLETED | OUTPATIENT
Start: 2019-08-26 | End: 2019-08-26

## 2019-08-26 RX ORDER — NYSTATIN 100000 [USP'U]/G
100000 CREAM TOPICAL TWICE DAILY
Qty: 1 | Refills: 0 | Status: COMPLETED | COMMUNITY
Start: 2019-06-17 | End: 2019-08-26

## 2019-08-26 RX ADMIN — SODIUM CHLORIDE 1000 MILLILITER(S): 9 INJECTION INTRAMUSCULAR; INTRAVENOUS; SUBCUTANEOUS at 23:22

## 2019-08-26 RX ADMIN — Medication 1000 MILLIGRAM(S): at 21:38

## 2019-08-26 RX ADMIN — SODIUM CHLORIDE 1600 MILLILITER(S): 9 INJECTION, SOLUTION INTRAVENOUS at 22:39

## 2019-08-26 RX ADMIN — MORPHINE SULFATE 4 MILLIGRAM(S): 50 CAPSULE, EXTENDED RELEASE ORAL at 21:38

## 2019-08-26 RX ADMIN — CEFTRIAXONE 1000 MILLIGRAM(S): 500 INJECTION, POWDER, FOR SOLUTION INTRAMUSCULAR; INTRAVENOUS at 21:39

## 2019-08-26 RX ADMIN — SODIUM CHLORIDE 1600 MILLILITER(S): 9 INJECTION, SOLUTION INTRAVENOUS at 21:37

## 2019-08-26 NOTE — ED ADULT NURSE REASSESSMENT NOTE - NS ED NURSE REASSESS COMMENT FT1
pt received. alert and oriented. states improved symptoms. awaiting repeat lactate after fluid bolus. will monitor.

## 2019-08-26 NOTE — HISTORY OF PRESENT ILLNESS
[FreeTextEntry8] : Pt presents  to the office today with complaints of left flank pain , fever and dysuria. She has a history of sickle cell anemia , and sees DR. Berumen , hematology at [facility]. She reports urinary frequency and dysuria 2 -3 days ago. This am she woke up and had chills and fever 100.8 F . She also complains of left flank pain extending to left pelvic region. It is worse with movement. She appears  visibility in pain , states sometimes sharp and constant.  \par Urine dip in office revealed + blood, + leukocytes. She denies chest pain, palpitations, nausea, vomiting, palpitations. \par

## 2019-08-26 NOTE — ED PROVIDER NOTE - CLINICAL SUMMARY MEDICAL DECISION MAKING FREE TEXT BOX
40 yo female with a PMH of OA, SC presents with 2-3 ays of dysuria and frequency and noticed today L flank pain. Labs, UA, CT, Reeval. -Mirza Astorga PA-C

## 2019-08-26 NOTE — ED ADULT NURSE NOTE - OBJECTIVE STATEMENT
c/o left flank pain with burning on urination & frequency with fever for 3 days. Saw PMD today who recommended to ER for further evaluation

## 2019-08-26 NOTE — PHYSICAL EXAM
[Well Nourished] : well nourished [No Lymphadenopathy] : no lymphadenopathy [Supple] : supple [No Respiratory Distress] : no respiratory distress  [Clear to Auscultation] : lungs were clear to auscultation bilaterally [No Accessory Muscle Use] : no accessory muscle use [Regular Rhythm] : with a regular rhythm [Normal Rate] : normal rate  [Soft] : abdomen soft [Normal S1, S2] : normal S1 and S2 [Non-distended] : non-distended [Normal Posterior Cervical Nodes] : no posterior cervical lymphadenopathy [No Focal Deficits] : no focal deficits [Coordination Grossly Intact] : coordination grossly intact [Normal Anterior Cervical Nodes] : no anterior cervical lymphadenopathy [Normal Gait] : normal gait [Normal Affect] : the affect was normal [Alert and Oriented x3] : oriented to person, place, and time [Normal Insight/Judgement] : insight and judgment were intact [de-identified] : Pt appears uncomfortable  [de-identified] : + [de-identified] : + left  CVA tenderness

## 2019-08-26 NOTE — REVIEW OF SYSTEMS
[Fever] : fever [Chills] : chills [Hematuria] : hematuria [Dysuria] : dysuria [Frequency] : frequency [Negative] : Neurological [FreeTextEntry8] : see HPI

## 2019-08-26 NOTE — ED STATDOCS - PROGRESS NOTE DETAILS
Lauro AS for Dr. Veras: 38 y/o female with a PMHx of Sickle Cell Disease presents to the ED c/o dysuria, left flank pain x 2 days. Pt states that she was seen by her PCP today, had her urine checked which showed blood in her urine. Pt was then told to come to the ED. Will send pt to main ED for further evaluation.

## 2019-08-26 NOTE — ED PROVIDER NOTE - CARE PLAN
Principal Discharge DX:	Pyelonephritis  Secondary Diagnosis:	Hypotension, unspecified hypotension type

## 2019-08-26 NOTE — ED PROVIDER NOTE - OBJECTIVE STATEMENT
40 yo female with a PMH of SC disease and OA of the hips presents with frequency of urination and dysuria x 2-3 days and today noticed L flank pain. +sub fever at home. Went to her PMD and had a UA which showed blood and bacteria in her urine and sent to the ER.

## 2019-08-26 NOTE — ED ADULT TRIAGE NOTE - CHIEF COMPLAINT QUOTE
pt c.o painful urinationf or 3 days and left flank pain with fever since today. pt seen by PCP and told she has hematuria and abcteria in her urine. pt has sickle cell anemia

## 2019-08-26 NOTE — ASSESSMENT
[FreeTextEntry1] : Possible kidney stone vs pyonephritis, \par Sent to ER for urology  evaluation,  stat  imaging/ renal sono \par Reports given to Triage Alicja CANALES\par F/up with pt post discharge

## 2019-08-27 DIAGNOSIS — Z98.891 HISTORY OF UTERINE SCAR FROM PREVIOUS SURGERY: Chronic | ICD-10-CM

## 2019-08-27 DIAGNOSIS — I95.9 HYPOTENSION, UNSPECIFIED: ICD-10-CM

## 2019-08-27 DIAGNOSIS — N12 TUBULO-INTERSTITIAL NEPHRITIS, NOT SPECIFIED AS ACUTE OR CHRONIC: ICD-10-CM

## 2019-08-27 DIAGNOSIS — Z98.890 OTHER SPECIFIED POSTPROCEDURAL STATES: Chronic | ICD-10-CM

## 2019-08-27 LAB
ADD ON TEST-SPECIMEN IN LAB: SIGNIFICANT CHANGE UP
ALBUMIN SERPL ELPH-MCNC: 2.8 G/DL — LOW (ref 3.3–5)
ALP SERPL-CCNC: 52 U/L — SIGNIFICANT CHANGE UP (ref 40–120)
ALT FLD-CCNC: 10 U/L — LOW (ref 12–78)
ANION GAP SERPL CALC-SCNC: 6 MMOL/L — SIGNIFICANT CHANGE UP (ref 5–17)
APTT BLD: 31.5 SEC — SIGNIFICANT CHANGE UP (ref 27.5–36.3)
AST SERPL-CCNC: 13 U/L — LOW (ref 15–37)
BASOPHILS # BLD AUTO: 0.04 K/UL — SIGNIFICANT CHANGE UP (ref 0–0.2)
BASOPHILS NFR BLD AUTO: 0.3 % — SIGNIFICANT CHANGE UP (ref 0–2)
BILIRUB SERPL-MCNC: 1.9 MG/DL — HIGH (ref 0.2–1.2)
BUN SERPL-MCNC: 6 MG/DL — LOW (ref 7–23)
CALCIUM SERPL-MCNC: 7.4 MG/DL — LOW (ref 8.5–10.1)
CHLORIDE SERPL-SCNC: 111 MMOL/L — HIGH (ref 96–108)
CO2 SERPL-SCNC: 23 MMOL/L — SIGNIFICANT CHANGE UP (ref 22–31)
CREAT SERPL-MCNC: 0.58 MG/DL — SIGNIFICANT CHANGE UP (ref 0.5–1.3)
E COLI DNA BLD POS QL NAA+NON-PROBE: SIGNIFICANT CHANGE UP
EOSINOPHIL # BLD AUTO: 0.02 K/UL — SIGNIFICANT CHANGE UP (ref 0–0.5)
EOSINOPHIL NFR BLD AUTO: 0.2 % — SIGNIFICANT CHANGE UP (ref 0–6)
FOLATE SERPL-MCNC: 19.1 NG/ML — SIGNIFICANT CHANGE UP
GLUCOSE SERPL-MCNC: 97 MG/DL — SIGNIFICANT CHANGE UP (ref 70–99)
GRAM STN FLD: SIGNIFICANT CHANGE UP
GRAM STN FLD: SIGNIFICANT CHANGE UP
HCT VFR BLD CALC: 21.9 % — LOW (ref 34.5–45)
HCT VFR BLD CALC: SIGNIFICANT CHANGE UP (ref 34.5–45)
HGB BLD-MCNC: 8.3 G/DL — LOW (ref 11.5–15.5)
HGB BLD-MCNC: 8.8 G/DL — LOW (ref 11.5–15.5)
IMM GRANULOCYTES NFR BLD AUTO: 0.5 % — SIGNIFICANT CHANGE UP (ref 0–1.5)
INR BLD: 1.41 RATIO — HIGH (ref 0.88–1.16)
LACTATE SERPL-SCNC: 0.7 MMOL/L — SIGNIFICANT CHANGE UP (ref 0.7–2)
LYMPHOCYTES # BLD AUTO: 1.65 K/UL — SIGNIFICANT CHANGE UP (ref 1–3.3)
LYMPHOCYTES # BLD AUTO: 12.9 % — LOW (ref 13–44)
MAGNESIUM SERPL-MCNC: 1.8 MG/DL — SIGNIFICANT CHANGE UP (ref 1.6–2.6)
MCHC RBC-ENTMCNC: 31.8 PG — SIGNIFICANT CHANGE UP (ref 27–34)
MCHC RBC-ENTMCNC: 37.9 GM/DL — HIGH (ref 32–36)
MCHC RBC-ENTMCNC: SIGNIFICANT CHANGE UP (ref 27–34)
MCHC RBC-ENTMCNC: SIGNIFICANT CHANGE UP (ref 32–36)
MCV RBC AUTO: 83.9 FL — SIGNIFICANT CHANGE UP (ref 80–100)
MCV RBC AUTO: SIGNIFICANT CHANGE UP (ref 80–100)
METHOD TYPE: SIGNIFICANT CHANGE UP
MONOCYTES # BLD AUTO: 0.85 K/UL — SIGNIFICANT CHANGE UP (ref 0–0.9)
MONOCYTES NFR BLD AUTO: 6.7 % — SIGNIFICANT CHANGE UP (ref 2–14)
NEUTROPHILS # BLD AUTO: 10.14 K/UL — HIGH (ref 1.8–7.4)
NEUTROPHILS NFR BLD AUTO: 79.4 % — HIGH (ref 43–77)
PHOSPHATE SERPL-MCNC: 2.5 MG/DL — SIGNIFICANT CHANGE UP (ref 2.5–4.5)
PLATELET # BLD AUTO: 196 K/UL — SIGNIFICANT CHANGE UP (ref 150–400)
PLATELET # BLD AUTO: 197 K/UL — SIGNIFICANT CHANGE UP (ref 150–400)
POTASSIUM SERPL-MCNC: 3.4 MMOL/L — LOW (ref 3.5–5.3)
POTASSIUM SERPL-SCNC: 3.4 MMOL/L — LOW (ref 3.5–5.3)
PROT SERPL-MCNC: 6.4 GM/DL — SIGNIFICANT CHANGE UP (ref 6–8.3)
PROTHROM AB SERPL-ACNC: 15.8 SEC — HIGH (ref 10–12.9)
RBC # BLD: 2.61 M/UL — LOW (ref 3.8–5.2)
RBC # BLD: 2.61 M/UL — LOW (ref 3.8–5.2)
RBC # BLD: SIGNIFICANT CHANGE UP M/UL (ref 3.8–5.2)
RBC # FLD: 12.7 % — SIGNIFICANT CHANGE UP (ref 10.3–14.5)
RBC # FLD: SIGNIFICANT CHANGE UP (ref 10.3–14.5)
RETICS #: 127.1 K/UL — HIGH (ref 25–125)
RETICS/RBC NFR: 4.9 % — HIGH (ref 0.5–2.5)
SODIUM SERPL-SCNC: 140 MMOL/L — SIGNIFICANT CHANGE UP (ref 135–145)
SPECIMEN SOURCE: SIGNIFICANT CHANGE UP
SPECIMEN SOURCE: SIGNIFICANT CHANGE UP
VIT B12 SERPL-MCNC: <150 PG/ML — LOW (ref 232–1245)
WBC # BLD: 10.6 K/UL — HIGH (ref 3.8–10.5)
WBC # BLD: 12.77 K/UL — HIGH (ref 3.8–10.5)
WBC # FLD AUTO: 10.6 K/UL — HIGH (ref 3.8–10.5)
WBC # FLD AUTO: 12.77 K/UL — HIGH (ref 3.8–10.5)

## 2019-08-27 PROCEDURE — 76705 ECHO EXAM OF ABDOMEN: CPT

## 2019-08-27 PROCEDURE — 85025 COMPLETE CBC W/AUTO DIFF WBC: CPT

## 2019-08-27 PROCEDURE — 87040 BLOOD CULTURE FOR BACTERIA: CPT

## 2019-08-27 PROCEDURE — 36415 COLL VENOUS BLD VENIPUNCTURE: CPT

## 2019-08-27 PROCEDURE — 82248 BILIRUBIN DIRECT: CPT

## 2019-08-27 PROCEDURE — 84100 ASSAY OF PHOSPHORUS: CPT

## 2019-08-27 PROCEDURE — 76705 ECHO EXAM OF ABDOMEN: CPT | Mod: 26

## 2019-08-27 PROCEDURE — 85610 PROTHROMBIN TIME: CPT

## 2019-08-27 PROCEDURE — 83735 ASSAY OF MAGNESIUM: CPT

## 2019-08-27 PROCEDURE — 85045 AUTOMATED RETICULOCYTE COUNT: CPT

## 2019-08-27 PROCEDURE — 80053 COMPREHEN METABOLIC PANEL: CPT

## 2019-08-27 PROCEDURE — 82746 ASSAY OF FOLIC ACID SERUM: CPT

## 2019-08-27 PROCEDURE — 93010 ELECTROCARDIOGRAM REPORT: CPT

## 2019-08-27 PROCEDURE — 85027 COMPLETE CBC AUTOMATED: CPT

## 2019-08-27 PROCEDURE — 85730 THROMBOPLASTIN TIME PARTIAL: CPT

## 2019-08-27 PROCEDURE — 93005 ELECTROCARDIOGRAM TRACING: CPT

## 2019-08-27 PROCEDURE — 82607 VITAMIN B-12: CPT

## 2019-08-27 PROCEDURE — 82247 BILIRUBIN TOTAL: CPT

## 2019-08-27 RX ORDER — ENOXAPARIN SODIUM 100 MG/ML
30 INJECTION SUBCUTANEOUS DAILY
Refills: 0 | Status: DISCONTINUED | OUTPATIENT
Start: 2019-08-27 | End: 2019-08-29

## 2019-08-27 RX ORDER — SODIUM CHLORIDE 9 MG/ML
1000 INJECTION INTRAMUSCULAR; INTRAVENOUS; SUBCUTANEOUS ONCE
Refills: 0 | Status: COMPLETED | OUTPATIENT
Start: 2019-08-27 | End: 2019-08-27

## 2019-08-27 RX ORDER — MORPHINE SULFATE 50 MG/1
2 CAPSULE, EXTENDED RELEASE ORAL EVERY 4 HOURS
Refills: 0 | Status: DISCONTINUED | OUTPATIENT
Start: 2019-08-27 | End: 2019-08-29

## 2019-08-27 RX ORDER — HEPARIN SODIUM 5000 [USP'U]/ML
5000 INJECTION INTRAVENOUS; SUBCUTANEOUS EVERY 8 HOURS
Refills: 0 | Status: DISCONTINUED | OUTPATIENT
Start: 2019-08-27 | End: 2019-08-27

## 2019-08-27 RX ORDER — PREGABALIN 225 MG/1
1000 CAPSULE ORAL DAILY
Refills: 0 | Status: DISCONTINUED | OUTPATIENT
Start: 2019-08-27 | End: 2019-08-29

## 2019-08-27 RX ORDER — POTASSIUM CHLORIDE 20 MEQ
40 PACKET (EA) ORAL ONCE
Refills: 0 | Status: COMPLETED | OUTPATIENT
Start: 2019-08-27 | End: 2019-08-27

## 2019-08-27 RX ORDER — OXYCODONE AND ACETAMINOPHEN 5; 325 MG/1; MG/1
1 TABLET ORAL EVERY 4 HOURS
Refills: 0 | Status: DISCONTINUED | OUTPATIENT
Start: 2019-08-27 | End: 2019-08-29

## 2019-08-27 RX ORDER — OMEGA-3 ACID ETHYL ESTERS 1 G
0 CAPSULE ORAL
Qty: 0 | Refills: 0 | DISCHARGE

## 2019-08-27 RX ORDER — DOCUSATE SODIUM 100 MG
100 CAPSULE ORAL THREE TIMES A DAY
Refills: 0 | Status: DISCONTINUED | OUTPATIENT
Start: 2019-08-27 | End: 2019-08-29

## 2019-08-27 RX ORDER — SODIUM CHLORIDE 9 MG/ML
1000 INJECTION INTRAMUSCULAR; INTRAVENOUS; SUBCUTANEOUS
Refills: 0 | Status: DISCONTINUED | OUTPATIENT
Start: 2019-08-27 | End: 2019-08-28

## 2019-08-27 RX ORDER — SODIUM CHLORIDE 9 MG/ML
1000 INJECTION INTRAMUSCULAR; INTRAVENOUS; SUBCUTANEOUS
Refills: 0 | Status: DISCONTINUED | OUTPATIENT
Start: 2019-08-27 | End: 2019-08-27

## 2019-08-27 RX ORDER — CEFTRIAXONE 500 MG/1
2000 INJECTION, POWDER, FOR SOLUTION INTRAMUSCULAR; INTRAVENOUS EVERY 24 HOURS
Refills: 0 | Status: DISCONTINUED | OUTPATIENT
Start: 2019-08-27 | End: 2019-08-28

## 2019-08-27 RX ORDER — OMEGA-3 ACID ETHYL ESTERS 1 G
4 CAPSULE ORAL DAILY
Refills: 0 | Status: DISCONTINUED | OUTPATIENT
Start: 2019-08-27 | End: 2019-08-29

## 2019-08-27 RX ORDER — PREGABALIN 225 MG/1
1 CAPSULE ORAL
Qty: 0 | Refills: 0 | DISCHARGE

## 2019-08-27 RX ORDER — ACETAMINOPHEN 500 MG
650 TABLET ORAL EVERY 4 HOURS
Refills: 0 | Status: DISCONTINUED | OUTPATIENT
Start: 2019-08-27 | End: 2019-08-29

## 2019-08-27 RX ORDER — LACTOBACILLUS ACIDOPHILUS 100MM CELL
1 CAPSULE ORAL
Refills: 0 | Status: DISCONTINUED | OUTPATIENT
Start: 2019-08-27 | End: 2019-08-29

## 2019-08-27 RX ORDER — CEFTRIAXONE 500 MG/1
1000 INJECTION, POWDER, FOR SOLUTION INTRAMUSCULAR; INTRAVENOUS EVERY 24 HOURS
Refills: 0 | Status: DISCONTINUED | OUTPATIENT
Start: 2019-08-27 | End: 2019-08-27

## 2019-08-27 RX ORDER — ONDANSETRON 8 MG/1
4 TABLET, FILM COATED ORAL EVERY 6 HOURS
Refills: 0 | Status: DISCONTINUED | OUTPATIENT
Start: 2019-08-27 | End: 2019-08-29

## 2019-08-27 RX ADMIN — SODIUM CHLORIDE 125 MILLILITER(S): 9 INJECTION INTRAMUSCULAR; INTRAVENOUS; SUBCUTANEOUS at 00:44

## 2019-08-27 RX ADMIN — CEFTRIAXONE 2000 MILLIGRAM(S): 500 INJECTION, POWDER, FOR SOLUTION INTRAMUSCULAR; INTRAVENOUS at 14:51

## 2019-08-27 RX ADMIN — Medication 1 TABLET(S): at 05:43

## 2019-08-27 RX ADMIN — HEPARIN SODIUM 5000 UNIT(S): 5000 INJECTION INTRAVENOUS; SUBCUTANEOUS at 05:42

## 2019-08-27 RX ADMIN — SODIUM CHLORIDE 1000 MILLILITER(S): 9 INJECTION INTRAMUSCULAR; INTRAVENOUS; SUBCUTANEOUS at 01:33

## 2019-08-27 RX ADMIN — MORPHINE SULFATE 2 MILLIGRAM(S): 50 CAPSULE, EXTENDED RELEASE ORAL at 01:41

## 2019-08-27 RX ADMIN — Medication 1 TABLET(S): at 18:37

## 2019-08-27 RX ADMIN — Medication 1 TABLET(S): at 11:06

## 2019-08-27 RX ADMIN — Medication 650 MILLIGRAM(S): at 03:33

## 2019-08-27 RX ADMIN — Medication 4 GRAM(S): at 11:06

## 2019-08-27 RX ADMIN — Medication 40 MILLIEQUIVALENT(S): at 14:50

## 2019-08-27 RX ADMIN — ONDANSETRON 4 MILLIGRAM(S): 8 TABLET, FILM COATED ORAL at 01:41

## 2019-08-27 RX ADMIN — Medication 650 MILLIGRAM(S): at 04:30

## 2019-08-27 RX ADMIN — Medication 650 MILLIGRAM(S): at 12:25

## 2019-08-27 RX ADMIN — ENOXAPARIN SODIUM 30 MILLIGRAM(S): 100 INJECTION SUBCUTANEOUS at 18:37

## 2019-08-27 RX ADMIN — SODIUM CHLORIDE 1000 MILLILITER(S): 9 INJECTION INTRAMUSCULAR; INTRAVENOUS; SUBCUTANEOUS at 03:35

## 2019-08-27 RX ADMIN — PREGABALIN 1000 MICROGRAM(S): 225 CAPSULE ORAL at 11:06

## 2019-08-27 RX ADMIN — MORPHINE SULFATE 2 MILLIGRAM(S): 50 CAPSULE, EXTENDED RELEASE ORAL at 02:35

## 2019-08-27 RX ADMIN — SODIUM CHLORIDE 1000 MILLILITER(S): 9 INJECTION INTRAMUSCULAR; INTRAVENOUS; SUBCUTANEOUS at 07:32

## 2019-08-27 NOTE — CHART NOTE - NSCHARTNOTEFT_GEN_A_CORE
Was called at 6:45 am for low blood pressure    Patient noted to have low blood pressures, to 80/45. She was status post 5 L fluids  I was going to admit her to ICU for pressor support, but she asked that we recheck her BP sitting up and standing up.   Her BP sitting down was 98/60 and 108/60 standing up  She had no crackles or O2 requirement. Recommended giving another 1 L fluid, and have ICU reassess afterwards

## 2019-08-27 NOTE — PATIENT PROFILE ADULT - FALLEN IN THE PAST
Medical Necessity


Medical necessity: [facility] M100 COPD: 55 yo w/ hx COPD w/ recent multiple 

hospitalizations presents in acute resp fx and acute COPD exacerbation. Meets 

MCG IP criteria for worsening COPD w/ acute resp fx w/ hypoxemia requiring O2 

to maintain sats>90%.  Steroids and nebs started.  Hx COPD, chronic hep B and C

, PTSD, chronic pain, homelessness, smoker.
no

## 2019-08-27 NOTE — H&P ADULT - NSICDXPASTMEDICALHX_GEN_ALL_CORE_FT
PAST MEDICAL HISTORY:  Acute UTI with sepsis 8/27/19    Gallstone     Hepatomegaly     Osteoarthritis of both hips, unspecified osteoarthritis type with h/o osteonecrosis of b/l hips at 6 months old    Renal cyst     Sickle cell disease

## 2019-08-27 NOTE — CONSULT NOTE ADULT - ASSESSMENT
Pt is a 48 yo female with a pmh/o sickle cell disease (last crisis many years ago and managed on fish oil/mvn/b12), Osteonecrosis of b/l hips at 6 mo old now with chronic OA, cholelithiasis, hepatomegaly, who presents sent from PMD due to UTI. Pt states she noted 3 days ago that her urine was dark, had urgency and increased frequency, and 8/26 began to have left flank pain that radiates to left lower abd/back area, aggravated by movement, alleviated by pain medication, fluctuates in intensity, 10/10 on presentation, pressure like, associated with fever, weakness. In ED pt noted to be febrile, hypotensive, with elevated lactate and +U/A, CT abd/pelvis no stones/hydro was given IV rocephin.     1. GNR sepsis/pyelonephritis/cystitis  - blood cx gnrs suspect Ecoli  - f/u urine cx f/u speciation/sensitivities of blood cx  - imaging reviewed  - increase rocephin to [id number]   - monitor temps  - tolerating abx well so far; no side effects noted  - reason for abx use and side effects reviewed with patient  - supportive care  - fu cbc    2. other issues - care per medicine

## 2019-08-27 NOTE — CHART NOTE - NSCHARTNOTEFT_GEN_A_CORE
Got called by RN that patient's BP was low. Assessed patient at bedside, without significant complaints other than mild but improving left flank pain. Left and right arm BPs both ~80/45 lying down. Patient's temp was 99.2 F rectally and HR ~ 80. Notified by RN that patient has only made 250 cc of urine in the last 8 hours, despite receiving 5L IVFs since admission. ICU was called to further evaluate.  While ICU was evaluating, patient's BP improved from 98/60 lying to 108/60 standing. ICU recommended another 1L bolus and to re-evaluate. Care to be transferred to day hospitalist.    Aryles Hedjar, MD  Night Hospitalist

## 2019-08-27 NOTE — PROVIDER CONTACT NOTE (OTHER) - SITUATION
Pt BP 91/56. HR 84. Temperature 100.3 after 1L bolus. Pt asymptomatic. Pt admitted for flank pain and UTI. Dr. Hein made aware. 1L bolus NS ordered STAT and ordered to administer Tylenol 650 STAT.

## 2019-08-27 NOTE — CONSULT NOTE ADULT - SUBJECTIVE AND OBJECTIVE BOX
Patient is a 39y old  Female who presents with a chief complaint of Pyelonephritis secondary to UTI complicated by sepsis (27 Aug 2019 01:14)    HPI:  Pt is a 48 yo female with a pmh/o sickle cell disease (last crisis many years ago and managed on fish oil/mvn/b12), Osteonecrosis of b/l hips at 6 mo old now with chronic OA, cholelithiasis, hepatomegaly, who presents sent from PMD due to UTI. Pt states she noted 3 days ago that her urine was dark, had urgency and increased frequency, and  began to have left flank pain that radiates to left lower abd/back area, aggravated by movement, alleviated by pain medication, fluctuates in intensity, 10/10 on presentation, pressure like, associated with fever, weakness. In ED pt noted to be febrile, hypotensive, with elevated lactate and +U/A, CT abd/pelvis no stones/hydro was given IV rocephin.       PMH: as above  PSH: as above  Meds: per reconciliation sheet, noted below  MEDICATIONS  (STANDING):  cefTRIAXone Injectable. 1000 milliGRAM(s) IV Push every 24 hours  cyanocobalamin 1000 MICROGram(s) Oral daily  heparin  Injectable 5000 Unit(s) SubCutaneous every 8 hours  lactobacillus acidophilus 1 Tablet(s) Oral two times a day  multivitamin 1 Tablet(s) Oral daily  omega-3-Acid Ethyl Esters 4 Gram(s) Oral daily  sodium chloride 0.9%. 1000 milliLiter(s) (125 mL/Hr) IV Continuous <Continuous>  sodium chloride 0.9%. 1000 milliLiter(s) (125 mL/Hr) IV Continuous <Continuous>      Allergies    No Known Drug Allergies  shellfish (Unknown)    Intolerances      Social: no smoking, no alcohol, no illegal drugs; no recent travel, no exposure to TB  FAMILY HISTORY:  Family history of high cholesterol: in mother, alive  Family history of hypertension in father: and parkinsons and CHF,      no history of premature cardiovascular disease in first degree relatives    ROS: no HA, no dizziness, no sore throat, no blurry vision, no CP, no palpitations, no SOB, no cough,  no diarrhea, no leg pain, no claudication, no rash, no joint aches, no rectal pain or bleeding, no night sweats  All other systems reviewed and are negative    Vital Signs Last 24 Hrs  T(C): 37.3 (27 Aug 2019 11:11), Max: 37.9 (27 Aug 2019 03:06)  T(F): 99.2 (27 Aug 2019 11:11), Max: 100.3 (27 Aug 2019 03:06)  HR: 89 (27 Aug 2019 11:11) (73 - 109)  BP: 90/55 (27 Aug 2019 11:11) (83/50 - 111/60)  BP(mean): --  RR: 18 (27 Aug 2019 11:11) (16 - 20)  SpO2: 100% (27 Aug 2019 11:11) (97% - 100%)  Daily         PE:  Constitutional: frail looking  HEENT: NC/AT, EOMI, PERRLA, conjunctivae clear; ears and nose atraumatic; pharynx benign  Neck: supple; thyroid not palpable  Back: no tenderness  Respiratory: respiratory effort normal; clear to auscultation  Cardiovascular: S1S2 regular, no murmurs  Abdomen: soft, not tender, not distended, positive BS  Genitourinary: R CVA tenderness  Lymphatic: no LN palpable  Musculoskeletal: no muscle tenderness, no joint swelling or tenderness  Extremities: no pedal edema  Neurological/ Psychiatric:  moving all extremities  Skin: no rashes; no palpable lesions    Labs: all available labs reviewed                        8.3    12.77 )-----------( 196      ( 27 Aug 2019 06:36 )             21.9         140  |  111<H>  |  6<L>  ----------------------------<  97  3.4<L>   |  23  |  0.58    Ca    7.4<L>      27 Aug 2019 06:36  Phos  2.5       Mg     1.8         TPro  6.4  /  Alb  2.8<L>  /  TBili  1.9<H>  /  DBili  x   /  AST  13<L>  /  ALT  10<L>  /  AlkPhos  52       LIVER FUNCTIONS - ( 27 Aug 2019 06:36 )  Alb: 2.8 g/dL / Pro: 6.4 gm/dL / ALK PHOS: 52 U/L / ALT: 10 U/L / AST: 13 U/L / GGT: x           Urinalysis Basic - ( 26 Aug 2019 21:27 )    Color: Yellow / Appearance: Slightly Turbid / S.005 / pH: x  Gluc: x / Ketone: Negative  / Bili: Negative / Urobili: 4 mg/dL   Blood: x / Protein: 100 mg/dL / Nitrite: Negative   Leuk Esterase: Moderate / RBC: >50 /HPF / WBC >50   Sq Epi: x / Non Sq Epi: Moderate / Bacteria: Moderate    Culture - Blood (19 @ 21:27)    Gram Stain:   Growth in anaerobic bottle: Gram Negative Rods    Specimen Source: .Blood None    Culture Results:   Growth in anaerobic bottle: Gram Negative Rods  "Due to technical problems, Proteus sp. will Not be reported as part of  the BCID panel until further notice"  ***Blood Panel PCR results on this specimen are available  approximately 3 hours after the Gram stain result.***  Gram stain, PCR, and/or culture results may not always  correspond due to difference in methodologies.  ************************************************************  This PCR assay was performed using [employer].  The following targets are tested for: Enterococcus,  vancomycin resistant enterococci, Listeria monocytogenes,  coagulase negative staphylococci, S. aureus,  methicillin resistant S. aureus, Streptococcus agalactiae  (Group B), S. pneumoniae, S. pyogenes (Group A),  Acinetobacter baumannii, Enterobacter cloacae, E. coli,  Klebsiella oxytoca, K. pneumoniae, Proteus sp.,  Serratia marcescens, Haemophilus influenzae,  Neisseria meningitidis, Pseudomonas aeruginosa, Candida  albicans, C. glabrata, C krusei, C parapsilosis,  C. tropicalis and the KPC resistance gene.          Radiology: all available radiological tests reviewed      EXAM:  CT ABDOMEN AND PELVIS                            PROCEDURE DATE:  2019          INTERPRETATION:  .    CLINICAL INFORMATION: Left-sided flank pain.    TECHNIQUE: Helical axial images were obtained from the domes of the   diaphragm through the pubic symphysis without the administration of IV or   oral contrast. Sagittal and coronal reformatted images were obtained from   the source data.    COMPARISON: Most recent prior CT examination of the abdomen and pelvis   from 2015.     FINDINGS: Evaluation of the heart, vascular structures, and   intra-abdominal organs is limited without the administration of IV   contrast. The ventricular chambers appear slightly hypodense when   compared to the interventricular septum, for which anemia can be   considered.  The imaged portions of the descending aorta and branch   arterial vessels appear unremarkable.    There is mild interlobular septal thickening at the lung bases. No   pleural effusions are visualized.    The unenhanced appearance to the spleen, pancreas, and adrenal glands   appear unremarkable.    Hepatomegaly is notable which appears unchanged.    Cholelithiasis is noted.    Bilateral renal cysts are notable. There is no hydroureteronephrosis   bilaterally. The urinary bladder appears within normal limits.    There are multiple scattered nonspecific subcentimeter retroperitoneal   and mesenteric lymph nodes.    There is no bowel obstruction or abdominal ascites. Gas and stool are   visualized throughout the large bowel loops. The appendix is normal.    The uterus and bilateral adnexa appear unremarkable.    There is severe osteoarthritis of the bilateral hips with subchondral   sclerosis, subchondral cystic change and flattening of the bilateral   femoral heads.Superimposed AVN is a possibility. The iliac bones appear   dysmorphic. Overall findings appear unchanged when compared to the prior   exam.    There is a leftward convex curvature to the thoracolumbar spine.    IMPRESSION:    1. No obstructive urolithiasis or hydronephrosis.    2. Cholelithiasis.    3. Similar-appearing severe bilateral hip osteoarthritis.      Advanced directives addressed: full resuscitation

## 2019-08-27 NOTE — H&P ADULT - ASSESSMENT
46 yo female with a pmh/o sickle cell disease (last crisis many years ago and managed on fish oil/mvn/b12), Osteonecrosis of b/l hips at 6 mo old now with chronic OA, cholelithiasis, hepatomegaly, who presents sent from PMD due to UTI, admitted for clinical pyelonephritis complicated by sepsis and concerning for underlying acute on chronic sickle cell crisis.     1) Clinical pyelonephritis due to UTI complicated by sepsis  Qsofa 1 46 yo female with a pmh/o sickle cell disease (last crisis many years ago and managed on fish oil/mvn/b12), Osteonecrosis of b/l hips at 6 mo old now with chronic OA, cholelithiasis, hepatomegaly, who presents sent from PMD due to UTI, admitted for clinical pyelonephritis complicated by sepsis and concerning for underlying acute on chronic sickle cell crisis.     1) Clinical pyelonephritis due to UTI complicated by severe sepsis  Admit to telemetry due to labile BP, consider downgrade in AM if VSS  Additional bolus ordered  Qsofa 1  ID consulted  Blood culture  U/A and culture  Chest x ray wnl  EKG reviewed, sinus tachycardia  Pain control with bowel regimen  lactate now <2  monitor lactate-> if >2 give additional bolus and f/u rpt s/p IVF  administer 30cc/kg bolus in increments of 500cc with evaluation of fluid status prior to subsequent bolus administration  monitor vitals closely  strict i/o's, external sanchez applied   Rocephin 1 g q d ordered  CT scan neg for abcess/stranding/hydronephrosis  CODE STATUS: FULL    2) Sickle cell disease  Pt with elevated bili and INR and mild scleral icterus noted on exam  concern for beginning of crisis- obtain reticulocyte count  cont MVN, B12, omega 3 in place of fish oil (patient may take own fish oil)    3) Osteonecrosis and OA of b/l hips  Chronic  pain at baseline  No further intervention  pain control    4) Incidental findings on CT scan  Renal cysts d/w pt, f/u as outpt  gallstones-known, no RUQ pain, no s/s acute olvin  Anemia- chronic, pt with Sickle cell disease  Mild intralobular thickening- f/u with PMD, no resp sx

## 2019-08-27 NOTE — H&P ADULT - HISTORY OF PRESENT ILLNESS
Pt is a 48 yo female with a pmh/o sickle cell disease (last crisis many years ago and managed on fish oil/mvn/b12), Osteonecrosis of b/l hips at 6 mo old now with chronic OA, cholelithiasis, hepatomegaly, who presents sent from PMD due to UTI. Pt states she noted 3 days ago that her urine was dark, had urgency and increased frequency, and today began to have left flank pain that radiates to left lower abd/back area, aggravated by movement, alleviated by pain medication, fluctuates in intensity, 10/10 on presentation, pressure like, associated with fever, weakness. In ED pt noted to be febrile, hypotensive, with elevated lactate and +U/A.     Denies cp, palpitations, increased abd girth, abd pain, n/v/d, jaundice, HA, rash, bleeding, sob.

## 2019-08-27 NOTE — H&P ADULT - ATTENDING COMMENTS
16 min of time spent discussing advanced care planning including code status, existence of health care proxy. Pt is FULL CODE at this time. Proxy is her  Cody Lassiter and can be reached at [phone]. Pt in agreement with above, all questions answered and concerns addressed.

## 2019-08-28 LAB
-  AMIKACIN: SIGNIFICANT CHANGE UP
-  AMPICILLIN/SULBACTAM: SIGNIFICANT CHANGE UP
-  AMPICILLIN: SIGNIFICANT CHANGE UP
-  AZTREONAM: SIGNIFICANT CHANGE UP
-  CEFAZOLIN: SIGNIFICANT CHANGE UP
-  CEFEPIME: SIGNIFICANT CHANGE UP
-  CEFOXITIN: SIGNIFICANT CHANGE UP
-  CEFTRIAXONE: SIGNIFICANT CHANGE UP
-  CIPROFLOXACIN: SIGNIFICANT CHANGE UP
-  GENTAMICIN: SIGNIFICANT CHANGE UP
-  IMIPENEM: SIGNIFICANT CHANGE UP
-  LEVOFLOXACIN: SIGNIFICANT CHANGE UP
-  MEROPENEM: SIGNIFICANT CHANGE UP
-  NITROFURANTOIN: SIGNIFICANT CHANGE UP
-  PIPERACILLIN/TAZOBACTAM: SIGNIFICANT CHANGE UP
-  TIGECYCLINE: SIGNIFICANT CHANGE UP
-  TOBRAMYCIN: SIGNIFICANT CHANGE UP
-  TRIMETHOPRIM/SULFAMETHOXAZOLE: SIGNIFICANT CHANGE UP
ALBUMIN SERPL ELPH-MCNC: 2.7 G/DL — LOW (ref 3.3–5)
ALP SERPL-CCNC: 64 U/L — SIGNIFICANT CHANGE UP (ref 40–120)
ALT FLD-CCNC: 14 U/L — SIGNIFICANT CHANGE UP (ref 12–78)
ANION GAP SERPL CALC-SCNC: 8 MMOL/L — SIGNIFICANT CHANGE UP (ref 5–17)
AST SERPL-CCNC: 19 U/L — SIGNIFICANT CHANGE UP (ref 15–37)
BILIRUB SERPL-MCNC: 1.2 MG/DL — SIGNIFICANT CHANGE UP (ref 0.2–1.2)
BUN SERPL-MCNC: 3 MG/DL — LOW (ref 7–23)
CALCIUM SERPL-MCNC: 8 MG/DL — LOW (ref 8.5–10.1)
CHLORIDE SERPL-SCNC: 112 MMOL/L — HIGH (ref 96–108)
CO2 SERPL-SCNC: 24 MMOL/L — SIGNIFICANT CHANGE UP (ref 22–31)
CREAT SERPL-MCNC: 0.66 MG/DL — SIGNIFICANT CHANGE UP (ref 0.5–1.3)
CULTURE RESULTS: SIGNIFICANT CHANGE UP
GLUCOSE SERPL-MCNC: 83 MG/DL — SIGNIFICANT CHANGE UP (ref 70–99)
HCT VFR BLD CALC: 21.7 % — LOW (ref 34.5–45)
HGB BLD-MCNC: 8.1 G/DL — LOW (ref 11.5–15.5)
MCHC RBC-ENTMCNC: 31.5 PG — SIGNIFICANT CHANGE UP (ref 27–34)
MCHC RBC-ENTMCNC: 37.3 GM/DL — HIGH (ref 32–36)
MCV RBC AUTO: 84.4 FL — SIGNIFICANT CHANGE UP (ref 80–100)
METHOD TYPE: SIGNIFICANT CHANGE UP
ORGANISM # SPEC MICROSCOPIC CNT: SIGNIFICANT CHANGE UP
ORGANISM # SPEC MICROSCOPIC CNT: SIGNIFICANT CHANGE UP
PLATELET # BLD AUTO: 188 K/UL — SIGNIFICANT CHANGE UP (ref 150–400)
POTASSIUM SERPL-MCNC: 3.5 MMOL/L — SIGNIFICANT CHANGE UP (ref 3.5–5.3)
POTASSIUM SERPL-SCNC: 3.5 MMOL/L — SIGNIFICANT CHANGE UP (ref 3.5–5.3)
PROT SERPL-MCNC: 6.7 GM/DL — SIGNIFICANT CHANGE UP (ref 6–8.3)
RBC # BLD: 2.57 M/UL — LOW (ref 3.8–5.2)
RBC # FLD: 12.8 % — SIGNIFICANT CHANGE UP (ref 10.3–14.5)
SODIUM SERPL-SCNC: 144 MMOL/L — SIGNIFICANT CHANGE UP (ref 135–145)
SPECIMEN SOURCE: SIGNIFICANT CHANGE UP
WBC # BLD: 8.49 K/UL — SIGNIFICANT CHANGE UP (ref 3.8–10.5)
WBC # FLD AUTO: 8.49 K/UL — SIGNIFICANT CHANGE UP (ref 3.8–10.5)

## 2019-08-28 RX ORDER — CEFTRIAXONE 500 MG/1
2000 INJECTION, POWDER, FOR SOLUTION INTRAMUSCULAR; INTRAVENOUS EVERY 24 HOURS
Refills: 0 | Status: DISCONTINUED | OUTPATIENT
Start: 2019-08-28 | End: 2019-08-29

## 2019-08-28 RX ADMIN — CEFTRIAXONE 100 MILLIGRAM(S): 500 INJECTION, POWDER, FOR SOLUTION INTRAMUSCULAR; INTRAVENOUS at 18:13

## 2019-08-28 RX ADMIN — Medication 650 MILLIGRAM(S): at 20:28

## 2019-08-28 RX ADMIN — CEFTRIAXONE 2000 MILLIGRAM(S): 500 INJECTION, POWDER, FOR SOLUTION INTRAMUSCULAR; INTRAVENOUS at 14:24

## 2019-08-28 RX ADMIN — Medication 1 TABLET(S): at 14:23

## 2019-08-28 RX ADMIN — PREGABALIN 1000 MICROGRAM(S): 225 CAPSULE ORAL at 14:22

## 2019-08-28 RX ADMIN — Medication 1 TABLET(S): at 18:15

## 2019-08-28 RX ADMIN — Medication 1 TABLET(S): at 05:17

## 2019-08-28 RX ADMIN — Medication 650 MILLIGRAM(S): at 06:59

## 2019-08-29 ENCOUNTER — TRANSCRIPTION ENCOUNTER (OUTPATIENT)
Age: 39
End: 2019-08-29

## 2019-08-29 VITALS
OXYGEN SATURATION: 99 % | SYSTOLIC BLOOD PRESSURE: 117 MMHG | RESPIRATION RATE: 16 BRPM | HEART RATE: 95 BPM | TEMPERATURE: 98 F | DIASTOLIC BLOOD PRESSURE: 65 MMHG

## 2019-08-29 LAB
-  AMIKACIN: SIGNIFICANT CHANGE UP
-  AMPICILLIN/SULBACTAM: SIGNIFICANT CHANGE UP
-  AMPICILLIN: SIGNIFICANT CHANGE UP
-  AZTREONAM: SIGNIFICANT CHANGE UP
-  CEFAZOLIN: SIGNIFICANT CHANGE UP
-  CEFEPIME: SIGNIFICANT CHANGE UP
-  CEFOXITIN: SIGNIFICANT CHANGE UP
-  CEFTRIAXONE: SIGNIFICANT CHANGE UP
-  CIPROFLOXACIN: SIGNIFICANT CHANGE UP
-  ERTAPENEM: SIGNIFICANT CHANGE UP
-  GENTAMICIN: SIGNIFICANT CHANGE UP
-  IMIPENEM: SIGNIFICANT CHANGE UP
-  LEVOFLOXACIN: SIGNIFICANT CHANGE UP
-  MEROPENEM: SIGNIFICANT CHANGE UP
-  PIPERACILLIN/TAZOBACTAM: SIGNIFICANT CHANGE UP
-  TOBRAMYCIN: SIGNIFICANT CHANGE UP
-  TRIMETHOPRIM/SULFAMETHOXAZOLE: SIGNIFICANT CHANGE UP
BACTERIA UR CULT: ABNORMAL
CULTURE RESULTS: SIGNIFICANT CHANGE UP
CULTURE RESULTS: SIGNIFICANT CHANGE UP
METHOD TYPE: SIGNIFICANT CHANGE UP
ORGANISM # SPEC MICROSCOPIC CNT: SIGNIFICANT CHANGE UP
SPECIMEN SOURCE: SIGNIFICANT CHANGE UP
SPECIMEN SOURCE: SIGNIFICANT CHANGE UP

## 2019-08-29 RX ORDER — CEFUROXIME AXETIL 250 MG
1 TABLET ORAL
Qty: 20 | Refills: 0
Start: 2019-08-29 | End: 2019-09-07

## 2019-08-29 RX ORDER — LACTOBACILLUS ACIDOPHILUS 100MM CELL
1 CAPSULE ORAL
Qty: 20 | Refills: 0
Start: 2019-08-29 | End: 2019-09-07

## 2019-08-29 RX ADMIN — CEFTRIAXONE 100 MILLIGRAM(S): 500 INJECTION, POWDER, FOR SOLUTION INTRAMUSCULAR; INTRAVENOUS at 13:53

## 2019-08-29 RX ADMIN — PREGABALIN 1000 MICROGRAM(S): 225 CAPSULE ORAL at 12:36

## 2019-08-29 RX ADMIN — Medication 1 TABLET(S): at 12:36

## 2019-08-29 RX ADMIN — Medication 650 MILLIGRAM(S): at 06:13

## 2019-08-29 RX ADMIN — Medication 1 TABLET(S): at 06:11

## 2019-08-29 RX ADMIN — Medication 650 MILLIGRAM(S): at 06:11

## 2019-08-29 NOTE — PROGRESS NOTE ADULT - SUBJECTIVE AND OBJECTIVE BOX
HOSPITALIST PROGRESS NOTE:  SUBJECTIVE:  PCP: PMD Arden Quiñones Dr.    Chief Complaint: Patient is a 39y old  Female who presents with a chief complaint of Pyelonephritis secondary to UTI complicated by sepsis (29 Aug 2019 12:15)    HPI:  Pt is a 48 yo female with a pmh/o sickle cell disease (last crisis many years ago and managed on fish oil/mvn/b12), Osteonecrosis of b/l hips at 6 mo old now with chronic OA, cholelithiasis, hepatomegaly, who presents sent from PMD due to UTI. Pt states she noted 3 days ago that her urine was dark, had urgency and increased frequency, and today began to have left flank pain that radiates to left lower abd/back area, aggravated by movement, alleviated by pain medication, fluctuates in intensity, 10/10 on presentation, pressure like, associated with fever, weakness. In ED pt noted to be febrile, hypotensive, with elevated lactate and +U/A.   Denies cp, palpitations, increased abd girth, abd pain, n/v/d, jaundice, HA, rash, bleeding, sob. (27 Aug 2019 01:14)    8/29: Above reviewed; Patient has no complaints.       Allergies:  No Known Drug Allergies  shellfish (Unknown)    REVIEW OF SYSTEMS:  See HPI. All other review of systems is negative unless indicated above.     OBJECTIVE  Physical Exam:  Vital Signs:    Vital Signs Last 24 Hrs  T(C): 36.4 (29 Aug 2019 11:02), Max: 38.1 (28 Aug 2019 20:09)  T(F): 97.6 (29 Aug 2019 11:02), Max: 100.5 (28 Aug 2019 20:09)  HR: 76 (29 Aug 2019 11:02) (76 - 100)  BP: 98/62 (29 Aug 2019 11:02) (97/52 - 117/68)  BP(mean): --  RR: 16 (29 Aug 2019 11:02) (16 - 18)  SpO2: 95% (29 Aug 2019 11:02) (95% - 100%)  I&O's Summary    28 Aug 2019 07:01  -  29 Aug 2019 07:00  --------------------------------------------------------  IN: 0 mL / OUT: 500 mL / NET: -500 mL        Constitutional: NAD, awake and alert, well-developed  Neurological: AAO x 3, no focal deficits  HEENT: PERRLA, EOMI, MMM  Neck: Soft and supple, No LAD, No JVD  Respiratory: Breath sounds are clear bilaterally, No wheezing, rales or rhonchi  Cardiovascular: S1 and S2, regular rate and rhythm; no Murmurs, gallops or rubs  Gastrointestinal: Bowel Sounds present, soft, nontender, nondistended, no guarding, no rebound tenderness  Back: No CVA tenderness   Extremities: No peripheral edema  Vascular: 2+ peripheral pulses  Musculoskeletal: 5/5 strength b/l upper and lower extremities  Skin: No rashes  Breast: Deferred  Rectal: Deferred    MEDICATIONS  (STANDING):  cefTRIAXone   IVPB 2000 milliGRAM(s) IV Intermittent every 24 hours  cyanocobalamin 1000 MICROGram(s) Oral daily  enoxaparin Injectable 30 milliGRAM(s) SubCutaneous daily  lactobacillus acidophilus 1 Tablet(s) Oral two times a day  multivitamin 1 Tablet(s) Oral daily  omega-3-Acid Ethyl Esters 4 Gram(s) Oral daily      LABS: All Labs Reviewed:                        8.1    8.49  )-----------( 188      ( 28 Aug 2019 07:07 )             21.7     08-28    144  |  112<H>  |  3<L>  ----------------------------<  83  3.5   |  24  |  0.66    Ca    8.0<L>      28 Aug 2019 07:07    TPro  6.7  /  Alb  2.7<L>  /  TBili  1.2  /  DBili  x   /  AST  19  /  ALT  14  /  AlkPhos  64  08-28      Blood Culture:   08-26 @ 21:27  Organism Blood Culture PCR  Gram Stain Blood -- Gram Stain   Growth in anaerobic bottle: Gram Negative Rods  Specimen Source .Blood None  Culture-Blood --        RADIOLOGY/EKG:    < from: Xray Chest 1 View-PORTABLE IMMEDIATE (08.26.19 @ 22:23) >    IMPRESSION:    No evidence of acute cardiopulmonary disease.    < end of copied text >    < from: CT Abdomen and Pelvis No Cont (08.26.19 @ 22:34) >    IMPRESSION:    1. No obstructive urolithiasis or hydronephrosis.    2. Cholelithiasis.    3. Similar-appearing severe bilateral hip osteoarthritis.    < end of copied text >
CHIEF COMPLAINT/Diagnosis: acute UTI w/ pyelonephritis/ sepsis    SUBJECTIVE: no complaints; temp spike this .2    REVIEW OF SYSTEMS:    CONSTITUTIONAL: No weakness, fevers or chills  EYES/ENT: No visual changes;  No vertigo or throat pain   NECK: No pain or stiffness  RESPIRATORY: No cough, wheezing, hemoptysis; No shortness of breath  CARDIOVASCULAR: No chest pain or palpitations  GASTROINTESTINAL: No abdominal or epigastric pain. No nausea, vomiting, or hematemesis; No diarrhea or constipation. No melena or hematochezia.  GENITOURINARY: No dysuria, frequency or hematuria  NEUROLOGICAL: No numbness or weakness  SKIN: No itching, burning, rashes, or lesions   All other review of systems is negative unless indicated above    Vital Signs Last 24 Hrs  T(C): 37.7 (28 Aug 2019 16:51), Max: 37.9 (28 Aug 2019 06:58)  T(F): 99.9 (28 Aug 2019 16:51), Max: 100.2 (28 Aug 2019 06:58)  HR: 90 (28 Aug 2019 16:51) (81 - 97)  BP: 113/62 (28 Aug 2019 16:51) (86/46 - 113/62)  BP(mean): --  RR: 18 (28 Aug 2019 16:51) (18 - 81)  SpO2: 100% (28 Aug 2019 16:51) (96% - 100%)    I&O's Summary    27 Aug 2019 07:01  -  28 Aug 2019 07:00  --------------------------------------------------------  IN: 1500 mL / OUT: 4450 mL / NET: -2950 mL        CAPILLARY BLOOD GLUCOSE          PHYSICAL EXAM:    Constitutional: NAD, awake and alert, well-developed  HEENT: PERR, EOMI, Normal Hearing, MMM  Neck: Soft and supple, No LAD, No JVD  Respiratory: Breath sounds are clear bilaterally, No wheezing, rales or rhonchi  Cardiovascular: S1 and S2, regular rate and rhythm, no Murmurs, gallops or rubs  Gastrointestinal: Bowel Sounds present, soft, nontender, nondistended, no guarding, no rebound  Extremities: No peripheral edema  Vascular: 2+ peripheral pulses  Neurological: A/O x 3, no focal deficits  Musculoskeletal: 5/5 strength b/l upper and lower extremities  Skin: No rashes    MEDICATIONS:  MEDICATIONS  (STANDING):  cefTRIAXone   IVPB 2000 milliGRAM(s) IV Intermittent every 24 hours  cyanocobalamin 1000 MICROGram(s) Oral daily  enoxaparin Injectable 30 milliGRAM(s) SubCutaneous daily  lactobacillus acidophilus 1 Tablet(s) Oral two times a day  multivitamin 1 Tablet(s) Oral daily  omega-3-Acid Ethyl Esters 4 Gram(s) Oral daily      LABS: All Labs Reviewed:                        8.1    8.49  )-----------( 188      ( 28 Aug 2019 07:07 )             21.7     08-28    144  |  112<H>  |  3<L>  ----------------------------<  83  3.5   |  24  |  0.66    Ca    8.0<L>      28 Aug 2019 07:07  Phos  2.5     08-27  Mg     1.8     08-27    TPro  6.7  /  Alb  2.7<L>  /  TBili  1.2  /  DBili  x   /  AST  19  /  ALT  14  /  AlkPhos  64  08-28    PT/INR - ( 27 Aug 2019 06:36 )   PT: 15.8 sec;   INR: 1.41 ratio         PTT - ( 27 Aug 2019 06:36 )  PTT:31.5 sec      Blood Culture: 08-26 @ 21:27  Organism Blood Culture PCR  Gram Stain Blood -- Gram Stain   Growth in anaerobic bottle: Gram Negative Rods  Specimen Source .Blood None  Culture-Blood --        Assessment and Plan:     48 yo female with a pmh/o sickle cell disease (last crisis many years ago and managed on fish oil/mvn/b12), Osteonecrosis of b/l hips at 6 mo old now with chronic OA, cholelithiasis, hepatomegaly, who presents sent from PMD due to UTI, admitted for clinical pyelonephritis complicated by sepsis and concerning for underlying acute on chronic sickle cell crisis.     1) severe sepsis secondary to gram negative bacteremia and likley pyelonephritis  -uti with pyelo  -c/w 2gm ceftraixone  -infectious disease consult appreciated  -blood cultures positive 4/4 botttles   -will repeat cultures [facility]  -Temp spike today 100. 2  -s/p 6L bolus on admit and then standing fluids; Given that BP is stable and urine is not dark, and patient has been frequently urinating, will discontinue iv fluids  -patient educated on aggressivly drinking oral fluids        2-Mild exacerbation of sickle cell - resolved  -patient now s/p several liters of IV fluids, now improved. no complaints. clinically looks well. She says she feels better.   cont MVN, B12, omega 3 in place of fish oil (patient may take own fish oil)  -reticulocytes and bili on admit was elevated, will order repeat for AM; U/s rule out cholydoclithiasis; previous gallstones knwn to patient  -clinically she is not in sickle cell crisis.      3) Osteonecrosis and OA of b/l hips  Chronic  pain at baseline  No further intervention  pain control    4) Incidental findings on CT scan  -renal cysts  -stable since 2015; d/w paitent for routine f/u outpatient    5-Chronic anemia, secondary to SS disease  -at baseline    6-dvt prophy  -sc lovenox    patients hematoligst is at [facility], Dr. Arden Garcia        Plan/ signout for AM attending: sickle cell patient here with pyelo. improving. came with severe sepsis. BP now stable.   anticipate can be discharge after blood cultures clear. Sent for repeat cultures for AM.   Antipate can likley be discahrged either firday or saturday.   Patient is a registered nurse.
CHIEF COMPLAINT/Diagnosis: uti/ gram negative bacteremia/ severe sepsis    SUBJECTIVE: no complaints    REVIEW OF SYSTEMS:    CONSTITUTIONAL: No weakness, fevers or chills  EYES/ENT: No visual changes;  No vertigo or throat pain   NECK: No pain or stiffness  RESPIRATORY: No cough, wheezing, hemoptysis; No shortness of breath  CARDIOVASCULAR: No chest pain or palpitations  GASTROINTESTINAL: No abdominal or epigastric pain. No nausea, vomiting, or hematemesis; No diarrhea or constipation. No melena or hematochezia.  GENITOURINARY: No dysuria, frequency or hematuria  NEUROLOGICAL: No numbness or weakness  SKIN: No itching, burning, rashes, or lesions   All other review of systems is negative unless indicated above    Vital Signs Last 24 Hrs  T(C): 37.7 (27 Aug 2019 12:24), Max: 37.9 (27 Aug 2019 03:06)  T(F): 99.8 (27 Aug 2019 12:24), Max: 100.3 (27 Aug 2019 03:06)  HR: 85 (27 Aug 2019 14:26) (73 - 109)  BP: 99/58 (27 Aug 2019 14:26) (83/50 - 111/60)  BP(mean): --  RR: 18 (27 Aug 2019 12:24) (16 - 20)  SpO2: 100% (27 Aug 2019 12:24) (97% - 100%)    I&O's Summary    26 Aug 2019 07:01  -  27 Aug 2019 07:00  --------------------------------------------------------  IN: 2700 mL / OUT: 250 mL / NET: 2450 mL    27 Aug 2019 07:01  -  27 Aug 2019 15:21  --------------------------------------------------------  IN: 0 mL / OUT: 1100 mL / NET: -1100 mL        CAPILLARY BLOOD GLUCOSE          PHYSICAL EXAM:    Constitutional: NAD, awake and alert, well-developed  HEENT: PERR, EOMI, Normal Hearing, MMM  Neck: Soft and supple, No LAD, No JVD  Respiratory: Breath sounds are clear bilaterally, No wheezing, rales or rhonchi  Cardiovascular: S1 and S2, regular rate and rhythm, no Murmurs, gallops or rubs  Gastrointestinal: Bowel Sounds present, soft, nontender, nondistended, no guarding, no rebound  Extremities: No peripheral edema  Vascular: 2+ peripheral pulses  Neurological: A/O x 3, no focal deficits  Musculoskeletal: 5/5 strength b/l upper and lower extremities  Skin: No rashes    MEDICATIONS:  MEDICATIONS  (STANDING):  cefTRIAXone Injectable. 2000 milliGRAM(s) IV Push every 24 hours  cyanocobalamin 1000 MICROGram(s) Oral daily  lactobacillus acidophilus 1 Tablet(s) Oral two times a day  multivitamin 1 Tablet(s) Oral daily  omega-3-Acid Ethyl Esters 4 Gram(s) Oral daily  sodium chloride 0.9%. 1000 milliLiter(s) (125 mL/Hr) IV Continuous <Continuous>  sodium chloride 0.9%. 1000 milliLiter(s) (125 mL/Hr) IV Continuous <Continuous>      LABS: All Labs Reviewed:                        8.3    12.77 )-----------( 196      ( 27 Aug 2019 06:36 )             21.9     08-27    140  |  111<H>  |  6<L>  ----------------------------<  97  3.4<L>   |  23  |  0.58    Ca    7.4<L>      27 Aug 2019 06:36  Phos  2.5     08-27  Mg     1.8     08-27    TPro  6.4  /  Alb  2.8<L>  /  TBili  1.9<H>  /  DBili  0.5<H>  /  AST  13<L>  /  ALT  10<L>  /  AlkPhos  52  08-27    PT/INR - ( 27 Aug 2019 06:36 )   PT: 15.8 sec;   INR: 1.41 ratio         PTT - ( 27 Aug 2019 06:36 )  PTT:31.5 sec      Blood Culture: 08-26 @ 21:27  Organism Blood Culture PCR  Gram Stain Blood -- Gram Stain   Growth in anaerobic bottle: Gram Negative Rods  Specimen Source .Blood None  Culture-Blood --      Assessment and Plan:     46 yo female with a pmh/o sickle cell disease (last crisis many years ago and managed on fish oil/mvn/b12), Osteonecrosis of b/l hips at 6 mo old now with chronic OA, cholelithiasis, hepatomegaly, who presents sent from PMD due to UTI, admitted for clinical pyelonephritis complicated by sepsis and concerning for underlying acute on chronic sickle cell crisis.     1) severe sepsis secondary to gram negative bacteremia and likley pyelonephritis  -uti with pyelo  -c/w 2gm ceftraixone  -blood cultures positive 4/4 botttles   -will repeat cultures [facility]  -s/p 6L bolus  -BP now stable  -c/w iv fluids ns@125cc/hr through the night      2) Sickle cell disease  Pt with elevated bili and INR and mild scleral icterus noted on exam  concern for beginning of crisis- obtain reticulocyte count  cont MVN, B12, omega 3 in place of fish oil (patient may take own fish oil)  will order u/s RUQ  will order haptoglobin, LDH  c/w aggressive iv fluids ns@125cc/hr    3) Osteonecrosis and OA of b/l hips  Chronic  pain at baseline  No further intervention  pain control    4) Incidental findings on CT scan  Renal cysts d/w pt, f/u as outpt  gallstones-known, no RUQ pain, no s/s acute olvin  Anemia- chronic, pt with Sickle cell disease  Mild intralobular thickening- f/u with PMD, no resp sx    5-dvt prophy  -young patient with anemia; hold of pharm dvt prophy  -ambulate
Date of service: 08-29-19 @ 12:16    pt seen and examined  feels better  no further dysuria, resolving flank pain  low grade temps    ROS: no fever or chills; denies dizziness, no HA, no SOB or cough, no abdominal pain, no diarrhea or constipation, no legs pain, no rashes      MEDICATIONS  (STANDING):  cefTRIAXone   IVPB 2000 milliGRAM(s) IV Intermittent every 24 hours  cyanocobalamin 1000 MICROGram(s) Oral daily  enoxaparin Injectable 30 milliGRAM(s) SubCutaneous daily  lactobacillus acidophilus 1 Tablet(s) Oral two times a day  multivitamin 1 Tablet(s) Oral daily  omega-3-Acid Ethyl Esters 4 Gram(s) Oral daily        Vital Signs Last 24 Hrs  T(C): 36.4 (29 Aug 2019 11:02), Max: 38.1 (28 Aug 2019 20:09)  T(F): 97.6 (29 Aug 2019 11:02), Max: 100.5 (28 Aug 2019 20:09)  HR: 76 (29 Aug 2019 11:02) (76 - 100)  BP: 98/62 (29 Aug 2019 11:02) (97/52 - 117/68)  BP(mean): --  RR: 16 (29 Aug 2019 11:02) (16 - 18)  SpO2: 95% (29 Aug 2019 11:02) (95% - 100%)      PE:  Constitutional: frail looking  HEENT: NC/AT, EOMI, PERRLA, conjunctivae clear; ears and nose atraumatic; pharynx benign  Neck: supple; thyroid not palpable  Back: no tenderness  Respiratory: respiratory effort normal; clear to auscultation  Cardiovascular: S1S2 regular, no murmurs  Abdomen: soft, not tender, not distended, positive BS  Genitourinary: R CVA tenderness  Lymphatic: no LN palpable  Musculoskeletal: no muscle tenderness, no joint swelling or tenderness  Extremities: no pedal edema  Neurological/ Psychiatric:  moving all extremities  Skin: no rashes; no palpable lesions    Labs: all available labs reviewed                                   8.1    8.49  )-----------( 188      ( 28 Aug 2019 07:07 )             21.7     08-28    144  |  112<H>  |  3<L>  ----------------------------<  83  3.5   |  24  |  0.66    Ca    8.0<L>      28 Aug 2019 07:07    TPro  6.7  /  Alb  2.7<L>  /  TBili  1.2  /  DBili  x   /  AST  19  /  ALT  14  /  AlkPhos  64  08-28           Cultures:     Culture - Urine (08.26.19 @ 21:27)    -  Amikacin: S <=16    -  Ampicillin: S <=8 These ampicillin results predict results for amoxicillin    -  Ampicillin/Sulbactam: S <=8/4 Enterobacter, Citrobacter, and Serratia may develop resistance during prolonged therapy (3-4 days)    -  Aztreonam: S <=4    -  Cefazolin: S <=8 (MIC_CL_COM_[id number]) For uncomplicated UTI with K. pneumoniae, E. coli, or P. mirablis: JAQUELIN <=16 is sensitive and JAQUELIN >=32 is resistant. This also predicts results for oral agents cefaclor, cefdinir, cefpodoxime, cefprozil, cefuroxime axetil, cephalexin and locarbef for uncomplicated UTI. Note that some isolates may be susceptible to these agents while testing resistant to cefazolin.    -  Cefepime: S <=4    -  Cefoxitin: S <=8    -  Ceftriaxone: S <=1 Enterobacter, Citrobacter, and Serratia may develop resistance during prolonged therapy    -  Ciprofloxacin: S <=1    -  Gentamicin: S <=4    -  Imipenem: S <=1    -  Levofloxacin: S <=2    -  Meropenem: S <=1    -  Nitrofurantoin: S <=32 Should not be used to treat pyelonephritis    -  Piperacillin/Tazobactam: S <=16    -  Tigecycline: S <=2    -  Tobramycin: S <=4    -  Trimethoprim/Sulfamethoxazole: S <=2/38    Specimen Source: .Urine None    Culture Results:   >100,000 CFU/ml Escherichia coli    Organism Identification: Escherichia coli    Organism: Escherichia coli    Method Type: JAQUELIN    Culture - Blood (08.26.19 @ 21:27)    Gram Stain:   Growth in aerobic bottle: Gram Negative Rods    Specimen Source: .Blood None    Culture Results:   Growth in aerobic bottle: Escherichia coli  See previous culture [id number]                Radiology: all available radiological tests reviewed      EXAM:  CT ABDOMEN AND PELVIS                            PROCEDURE DATE:  08/26/2019          INTERPRETATION:  .    CLINICAL INFORMATION: Left-sided flank pain.    TECHNIQUE: Helical axial images were obtained from the domes of the   diaphragm through the pubic symphysis without the administration of IV or   oral contrast. Sagittal and coronal reformatted images were obtained from   the source data.    COMPARISON: Most recent prior CT examination of the abdomen and pelvis   from 5/8/2015.     FINDINGS: Evaluation of the heart, vascular structures, and   intra-abdominal organs is limited without the administration of IV   contrast. The ventricular chambers appear slightly hypodense when   compared to the interventricular septum, for which anemia can be   considered.  The imaged portions of the descending aorta and branch   arterial vessels appear unremarkable.    There is mild interlobular septal thickening at the lung bases. No   pleural effusions are visualized.    The unenhanced appearance to the spleen, pancreas, and adrenal glands   appear unremarkable.    Hepatomegaly is notable which appears unchanged.    Cholelithiasis is noted.    Bilateral renal cysts are notable. There is no hydroureteronephrosis   bilaterally. The urinary bladder appears within normal limits.    There are multiple scattered nonspecific subcentimeter retroperitoneal   and mesenteric lymph nodes.    There is no bowel obstruction or abdominal ascites. Gas and stool are   visualized throughout the large bowel loops. The appendix is normal.    The uterus and bilateral adnexa appear unremarkable.    There is severe osteoarthritis of the bilateral hips with subchondral   sclerosis, subchondral cystic change and flattening of the bilateral   femoral heads.Superimposed AVN is a possibility. The iliac bones appear   dysmorphic. Overall findings appear unchanged when compared to the prior   exam.    There is a leftward convex curvature to the thoracolumbar spine.    IMPRESSION:    1. No obstructive urolithiasis or hydronephrosis.    2. Cholelithiasis.    3. Similar-appearing severe bilateral hip osteoarthritis.      Advanced directives addressed: full resuscitation
Date of service: 08-28-19 @ 16:28    pt seen and examined  feels better  less flank pain  afebrile    ROS: no fever or chills; denies dizziness, no HA, no SOB or cough, no abdominal pain, no diarrhea or constipation, no legs pain, no rashes    MEDICATIONS  (STANDING):  cefTRIAXone   IVPB 2000 milliGRAM(s) IV Intermittent every 24 hours  cyanocobalamin 1000 MICROGram(s) Oral daily  enoxaparin Injectable 30 milliGRAM(s) SubCutaneous daily  lactobacillus acidophilus 1 Tablet(s) Oral two times a day  multivitamin 1 Tablet(s) Oral daily  omega-3-Acid Ethyl Esters 4 Gram(s) Oral daily  sodium chloride 0.9%. 1000 milliLiter(s) (125 mL/Hr) IV Continuous <Continuous>      Vital Signs Last 24 Hrs  T(C): 36.7 (28 Aug 2019 10:33), Max: 37.9 (28 Aug 2019 06:58)  T(F): 98.1 (28 Aug 2019 10:33), Max: 100.2 (28 Aug 2019 06:58)  HR: 81 (28 Aug 2019 10:33) (81 - 97)  BP: 86/46 (28 Aug 2019 10:33) (86/46 - 113/60)  BP(mean): --  RR: 81 (28 Aug 2019 10:33) (18 - 81)  SpO2: 100% (28 Aug 2019 10:33) (96% - 100%)          PE:  Constitutional: frail looking  HEENT: NC/AT, EOMI, PERRLA, conjunctivae clear; ears and nose atraumatic; pharynx benign  Neck: supple; thyroid not palpable  Back: no tenderness  Respiratory: respiratory effort normal; clear to auscultation  Cardiovascular: S1S2 regular, no murmurs  Abdomen: soft, not tender, not distended, positive BS  Genitourinary: R CVA tenderness  Lymphatic: no LN palpable  Musculoskeletal: no muscle tenderness, no joint swelling or tenderness  Extremities: no pedal edema  Neurological/ Psychiatric:  moving all extremities  Skin: no rashes; no palpable lesions    Labs: all available labs reviewed                                   8.1    8.49  )-----------( 188      ( 28 Aug 2019 07:07 )             21.7     08-28    144  |  112<H>  |  3<L>  ----------------------------<  83  3.5   |  24  |  0.66    Ca    8.0<L>      28 Aug 2019 07:07  Phos  2.5     08-27  Mg     1.8     08-27    TPro  6.7  /  Alb  2.7<L>  /  TBili  1.2  /  DBili  x   /  AST  19  /  ALT  14  /  AlkPhos  64  08-28    Culture - Urine (08.26.19 @ 21:27)    Specimen Source: .Urine None    Culture Results:   >100,000 CFU/ml Gram Negative Rods    Culture - Blood (08.26.19 @ 21:27)    Gram Stain:   Growth in aerobic bottle: Gram Negative Rods    Specimen Source: .Blood None    Culture Results:   Growth in aerobic bottle: Gram Negative Rods    Culture - Blood (08.26.19 @ 21:27)    -  Escherichia coli: Detec    Gram Stain:   Growth in anaerobic bottle: Gram Negative Rods    Specimen Source: .Blood None    Organism: Blood Culture PCR    Culture Results:   Growth in anaerobic bottle: Escherichia coli  "Due to technical problems, Proteus sp. will Not be reported as part of  the BCID panel until further notice"  ***Blood Panel PCR results on this specimen are available  approximately 3 hours after the Gram stain result.***  Gram stain, PCR, and/or culture results may not always  correspond due to difference in methodologies.  ************************************************************  This PCR assay was performed using [employer].  The following targets are tested for: Enterococcus,  vancomycin resistant enterococci, Listeria monocytogenes,  coagulase negative staphylococci, S. aureus,  methicillin resistant S. aureus, Streptococcus agalactiae  (Group B), S. pneumoniae, S. pyogenes (Group A),  Acinetobacter baumannii, Enterobacter cloacae, E. coli,  Klebsiella oxytoca, K. pneumoniae, Proteus sp.,  Serratia marcescens, Haemophilus influenzae,  Neisseria meningitidis, Pseudomonas aeruginosa, Candida  albicans, C. glabrata, C krusei, C parapsilosis,  C. tropicalis and the KPC resistance gene.    Organism Identification: Blood Culture PCR    Method Type: PCR            Radiology: all available radiological tests reviewed      EXAM:  CT ABDOMEN AND PELVIS                            PROCEDURE DATE:  08/26/2019          INTERPRETATION:  .    CLINICAL INFORMATION: Left-sided flank pain.    TECHNIQUE: Helical axial images were obtained from the domes of the   diaphragm through the pubic symphysis without the administration of IV or   oral contrast. Sagittal and coronal reformatted images were obtained from   the source data.    COMPARISON: Most recent prior CT examination of the abdomen and pelvis   from 5/8/2015.     FINDINGS: Evaluation of the heart, vascular structures, and   intra-abdominal organs is limited without the administration of IV   contrast. The ventricular chambers appear slightly hypodense when   compared to the interventricular septum, for which anemia can be   considered.  The imaged portions of the descending aorta and branch   arterial vessels appear unremarkable.    There is mild interlobular septal thickening at the lung bases. No   pleural effusions are visualized.    The unenhanced appearance to the spleen, pancreas, and adrenal glands   appear unremarkable.    Hepatomegaly is notable which appears unchanged.    Cholelithiasis is noted.    Bilateral renal cysts are notable. There is no hydroureteronephrosis   bilaterally. The urinary bladder appears within normal limits.    There are multiple scattered nonspecific subcentimeter retroperitoneal   and mesenteric lymph nodes.    There is no bowel obstruction or abdominal ascites. Gas and stool are   visualized throughout the large bowel loops. The appendix is normal.    The uterus and bilateral adnexa appear unremarkable.    There is severe osteoarthritis of the bilateral hips with subchondral   sclerosis, subchondral cystic change and flattening of the bilateral   femoral heads.Superimposed AVN is a possibility. The iliac bones appear   dysmorphic. Overall findings appear unchanged when compared to the prior   exam.    There is a leftward convex curvature to the thoracolumbar spine.    IMPRESSION:    1. No obstructive urolithiasis or hydronephrosis.    2. Cholelithiasis.    3. Similar-appearing severe bilateral hip osteoarthritis.      Advanced directives addressed: full resuscitation

## 2019-08-29 NOTE — PROGRESS NOTE ADULT - ASSESSMENT
48 yo female with a pmh/o sickle cell disease (last crisis many years ago and managed on fish oil/mvn/b12), Osteonecrosis of b/l hips at 6 mo old now with chronic OA, cholelithiasis, hepatomegaly, who presents sent from PMD due to UTI, admitted for clinical pyelonephritis complicated by sepsis and concerning for underlying acute on chronic sickle cell crisis.     1) severe Ecoli sepsis secondary likley pyelonephritis  -uti with pyelo  -s/p 6L bolus on admit and then standing fluids; Given that BP is stable and urine is not dark, and patient has been frequently urinating, will discontinue iv fluids  -patient educated on aggressivly drinking oral fluids  -c/w 2gm ceftraixone #4  -infectious disease consult appreciated dc with oral ceftin 500mg BID complete 10-14 day course  -blood cultures positive 4/4 botttles   -FU repeat blood cultures tomorrow    2-Mild exacerbation of sickle cell - resolved  -patient now s/p several liters of IV fluids, now improved. no complaints. clinically looks well. She says she feels better.   cont MVN, B12, omega 3 in place of fish oil (patient may take own fish oil)  -reticulocytes and bili on admit was elevated, will order repeat for AM; U/s rule out cholydoclithiasis; previous gallstones knwn to patient  -clinically she is not in sickle cell crisis.    3) Osteonecrosis and OA of b/l hips  Chronic  pain at baseline  No further intervention  pain control    4) Incidental findings on CT scan  -renal cysts  -stable since 2015; d/w paitent for routine f/u outpatient    5-Chronic anemia, secondary to SS disease  -at baseline    6-dvt prophy  -sc lovenox    patients hematoligst is at [facility], Dr. Arden Garcia    Plan/ signout for AM attending: sickle cell patient here with pyelo. improving. came with severe sepsis. BP now stable.   anticipate can be discharge after blood cultures clear. Sent for repeat cultures for AM.   Antipate can likley be discahrged either firday or saturday.   Patient is a registered nurse.
Pt is a 48 yo female with a pmh/o sickle cell disease (last crisis many years ago and managed on fish oil/mvn/b12), Osteonecrosis of b/l hips at 6 mo old now with chronic OA, cholelithiasis, hepatomegaly, who presents sent from PMD due to UTI. Pt states she noted 3 days ago that her urine was dark, had urgency and increased frequency, and 8/26 began to have left flank pain that radiates to left lower abd/back area, aggravated by movement, alleviated by pain medication, fluctuates in intensity, 10/10 on presentation, pressure like, associated with fever, weakness. In ED pt noted to be febrile, hypotensive, with elevated lactate and +U/A, CT abd/pelvis no stones/hydro was given IV rocephin.     1. Ecoli sepsis/pyelonephritis/cystitis  - blood cx/urine cx growing Ecoli sensitivities reviewed  - continue with rocephin [id number] #4  - can dc with oral ceftin 500mg BID complete 10-14 day course  - imaging reviewed  - monitor temps  - tolerating abx well so far; no side effects noted  - reason for abx use and side effects reviewed with patient  - supportive care  - fu cbc    2. other issues - care per medicine
Pt is a 48 yo female with a pmh/o sickle cell disease (last crisis many years ago and managed on fish oil/mvn/b12), Osteonecrosis of b/l hips at 6 mo old now with chronic OA, cholelithiasis, hepatomegaly, who presents sent from PMD due to UTI. Pt states she noted 3 days ago that her urine was dark, had urgency and increased frequency, and 8/26 began to have left flank pain that radiates to left lower abd/back area, aggravated by movement, alleviated by pain medication, fluctuates in intensity, 10/10 on presentation, pressure like, associated with fever, weakness. In ED pt noted to be febrile, hypotensive, with elevated lactate and +U/A, CT abd/pelvis no stones/hydro was given IV rocephin.     1. Ecoli sepsis/pyelonephritis/cystitis  - blood cx/urine cx growing Ecoli f/u sensitivities   - continue with rocephin [id number] #3  - imaging reviewed  - monitor temps  - tolerating abx well so far; no side effects noted  - reason for abx use and side effects reviewed with patient  - supportive care  - fu cbc    2. other issues - care per medicine

## 2019-08-29 NOTE — PROGRESS NOTE ADULT - REASON FOR ADMISSION
Pyelonephritis secondary to UTI complicated by sepsis

## 2019-08-29 NOTE — DISCHARGE NOTE NURSING/CASE MANAGEMENT/SOCIAL WORK - PATIENT PORTAL LINK FT
You can access the FollowMyHealth Patient Portal offered by [facility] by registering at the following website: [email or web address]. By joining [employer]’s FollowMyHealth portal, you will also be able to view your health information using other applications (apps) compatible with our system.

## 2019-08-29 NOTE — DISCHARGE NOTE PROVIDER - HOSPITAL COURSE
HOSPITALIST PROGRESS NOTE:    SUBJECTIVE:    PCP: PMD Arden Quiñones Dr.        Chief Complaint: Patient is a 39y old  Female who presents with a chief complaint of Pyelonephritis secondary to UTI complicated by sepsis (29 Aug 2019 12:15)        HPI:    Pt is a 46 yo female with a pmh/o sickle cell disease (last crisis many years ago and managed on fish oil/mvn/b12), Osteonecrosis of b/l hips at 6 mo old now with chronic OA, cholelithiasis, hepatomegaly, who presents sent from PMD due to UTI. Pt states she noted 3 days ago that her urine was dark, had urgency and increased frequency, and today began to have left flank pain that radiates to left lower abd/back area, aggravated by movement, alleviated by pain medication, fluctuates in intensity, 10/10 on presentation, pressure like, associated with fever, weakness. In ED pt noted to be febrile, hypotensive, with elevated lactate and +U/A.     Denies cp, palpitations, increased abd girth, abd pain, n/v/d, jaundice, HA, rash, bleeding, sob. (27 Aug 2019 01:14)        8/29: Above reviewed; Patient has no complaints.                     Assessment and Plan:     · Assessment        46 yo female with a pmh/o sickle cell disease (last crisis many years ago and managed on fish oil/mvn/b12), Osteonecrosis of b/l hips at 6 mo old now with chronic OA, cholelithiasis, hepatomegaly, who presents sent from PMD due to UTI, admitted for clinical pyelonephritis complicated by sepsis and concerning for underlying acute on chronic sickle cell crisis.         1) severe Ecoli sepsis secondary likley pyelonephritis    -uti with pyelo    -s/p 6L bolus on admit and then standing fluids; Given that BP is stable and urine is not dark, and patient has been frequently urinating, will discontinue iv fluids    -patient educated on aggressivly drinking oral fluids    -c/w 2gm ceftraixone #4    -infectious disease consult appreciated dc with oral ceftin 500mg BID complete 10-14 day course    -blood cultures positive 4/4 botttles     -FU repeat blood cultures tomorrow    -patient wants to leave today, and not wait for the repeat Blood CX; she willl have to FU as outpaitent         2-Mild exacerbation of sickle cell - resolved    -patient now s/p several liters of IV fluids, now improved. no complaints. clinically looks well. She says she feels better.     cont MVN, B12, omega 3 in place of fish oil (patient may take own fish oil)    -reticulocytes and bili on admit was elevated, will order repeat for AM; U/s rule out cholydoclithiasis; previous gallstones knwn to patient    -clinically she is not in sickle cell crisis.        3) Osteonecrosis and OA of b/l hips    Chronic    pain at baseline    No further intervention    pain control        4) Incidental findings on CT scan    -renal cysts    -stable since 2015; d/w paitent for routine f/u outpatient        5-Chronic anemia, secondary to SS disease    -at baseline        6-dvt prophy    -sc lovenox        patients hematoligst is at [facility], Dr. Arden Garcia        total time 45 minutes

## 2019-08-30 NOTE — DISCUSSION/SUMMARY
[Follow Up Call to Patient's Family] : follow up call to patient's family [Home] : patient was discharged to home

## 2019-09-03 LAB
CULTURE RESULTS: SIGNIFICANT CHANGE UP
CULTURE RESULTS: SIGNIFICANT CHANGE UP
SPECIMEN SOURCE: SIGNIFICANT CHANGE UP
SPECIMEN SOURCE: SIGNIFICANT CHANGE UP

## 2019-09-04 DIAGNOSIS — D57.00 HB-SS DISEASE WITH CRISIS, UNSPECIFIED: ICD-10-CM

## 2019-09-04 DIAGNOSIS — A41.51 SEPSIS DUE TO ESCHERICHIA COLI [E. COLI]: ICD-10-CM

## 2019-09-04 DIAGNOSIS — N12 TUBULO-INTERSTITIAL NEPHRITIS, NOT SPECIFIED AS ACUTE OR CHRONIC: ICD-10-CM

## 2019-09-04 DIAGNOSIS — I95.9 HYPOTENSION, UNSPECIFIED: ICD-10-CM

## 2019-09-04 DIAGNOSIS — D64.9 ANEMIA, UNSPECIFIED: ICD-10-CM

## 2019-09-04 DIAGNOSIS — N28.1 CYST OF KIDNEY, ACQUIRED: ICD-10-CM

## 2019-09-04 DIAGNOSIS — M87.9 OSTEONECROSIS, UNSPECIFIED: ICD-10-CM

## 2019-09-04 DIAGNOSIS — R65.20 SEVERE SEPSIS WITHOUT SEPTIC SHOCK: ICD-10-CM

## 2019-09-04 DIAGNOSIS — N39.0 URINARY TRACT INFECTION, SITE NOT SPECIFIED: ICD-10-CM

## 2019-09-04 DIAGNOSIS — M16.0 BILATERAL PRIMARY OSTEOARTHRITIS OF HIP: ICD-10-CM

## 2019-09-04 DIAGNOSIS — N10 ACUTE PYELONEPHRITIS: ICD-10-CM

## 2019-09-16 ENCOUNTER — RX RENEWAL (OUTPATIENT)
Age: 39
End: 2019-09-16

## 2019-12-09 ENCOUNTER — LABORATORY RESULT (OUTPATIENT)
Age: 39
End: 2019-12-09

## 2019-12-09 ENCOUNTER — APPOINTMENT (OUTPATIENT)
Dept: INTERNAL MEDICINE | Facility: CLINIC | Age: 39
End: 2019-12-09
Payer: COMMERCIAL

## 2019-12-09 VITALS
WEIGHT: 114 LBS | HEIGHT: 64 IN | RESPIRATION RATE: 16 BRPM | TEMPERATURE: 98.7 F | OXYGEN SATURATION: 97 % | DIASTOLIC BLOOD PRESSURE: 68 MMHG | SYSTOLIC BLOOD PRESSURE: 100 MMHG | HEART RATE: 78 BPM | BODY MASS INDEX: 19.46 KG/M2

## 2019-12-09 DIAGNOSIS — J06.9 ACUTE UPPER RESPIRATORY INFECTION, UNSPECIFIED: ICD-10-CM

## 2019-12-09 DIAGNOSIS — R21 RASH AND OTHER NONSPECIFIC SKIN ERUPTION: ICD-10-CM

## 2019-12-09 PROBLEM — R16.0 HEPATOMEGALY, NOT ELSEWHERE CLASSIFIED: Chronic | Status: ACTIVE | Noted: 2019-08-27

## 2019-12-09 PROBLEM — M16.0 BILATERAL PRIMARY OSTEOARTHRITIS OF HIP: Chronic | Status: ACTIVE | Noted: 2019-08-26

## 2019-12-09 PROBLEM — N39.0 URINARY TRACT INFECTION, SITE NOT SPECIFIED: Chronic | Status: ACTIVE | Noted: 2019-08-27

## 2019-12-09 PROBLEM — N28.1 CYST OF KIDNEY, ACQUIRED: Chronic | Status: ACTIVE | Noted: 2019-08-27

## 2019-12-09 PROBLEM — K80.20 CALCULUS OF GALLBLADDER WITHOUT CHOLECYSTITIS WITHOUT OBSTRUCTION: Chronic | Status: ACTIVE | Noted: 2019-08-27

## 2019-12-09 PROCEDURE — 99214 OFFICE O/P EST MOD 30 MIN: CPT

## 2019-12-09 RX ORDER — MULTIVIT WITH IRON,MINERALS
1250-133 TABLET ORAL
Refills: 0 | Status: ACTIVE | COMMUNITY

## 2019-12-09 NOTE — PHYSICAL EXAM
[Normal] : normal rate, regular rhythm, normal S1 and S2 and no murmur heard [de-identified] : Nonpruritic erythematous circular bumps alternating in sizes and alternating flat to slightly raised sparsely scattered on upper extremities and lower extremities.  No rash noted on chest or back [Alert and Oriented x3] : oriented to person, place, and time [de-identified] : n

## 2019-12-09 NOTE — PLAN
[FreeTextEntry1] : Start Doxycycline bid x 7 days with food\par Take Medrol dose pack with food.\par CBC and BMP ordered.\par It was stressed to patient the importance of maintaining hydration.\par Sick note given to patient to excuse from work today.\par Referred to Dermatology Dr. Rich.\par Patient advised to make a f/u appt with her hematologist Dr. Berumen.\par Advised patient if the rash worsens or she develops sob, wheezing, chest pain she is to go to the ER.

## 2019-12-09 NOTE — HISTORY OF PRESENT ILLNESS
[FreeTextEntry8] : Patient comes in today with c/o rash that started 5 days ago. The rash is not itchy and patient is describing them as hives. She took Benadryl and they have not gone away. She feels she has gotten some new areas of rash since Thursday. She has not used any new creams, lotions, soaps, or had any changes in her diet. She is allergic to shellfish but denies having any shellfish. She denies SOB, chest pain, wheezing, angioedema. \par She also is c/o of nasal congestion and a productive cough of yellow/green mucus x 2 weeks that is not going away. She states she had a 99.2 fever at home. She is stating she feels weak and would like blood work done. She denies any pain. Denies any UTI symptoms. She called out sick from work today.\par She has hx of sickle cell anemia and sees hematologist Dr. Berumen at [facility]. She says she last saw him 6 months ago and does not follow up regularly because "I manage it on my own."  She states her last sickle cell crisis was "long time ago."

## 2019-12-10 LAB
ANION GAP SERPL CALC-SCNC: 9 MMOL/L
BASOPHILS # BLD AUTO: 0 K/UL
BASOPHILS NFR BLD AUTO: 0 %
BUN SERPL-MCNC: 9 MG/DL
CALCIUM SERPL-MCNC: 9.8 MG/DL
CHLORIDE SERPL-SCNC: 103 MMOL/L
CO2 SERPL-SCNC: 27 MMOL/L
CREAT SERPL-MCNC: 0.74 MG/DL
EOSINOPHIL # BLD AUTO: 0.25 K/UL
EOSINOPHIL NFR BLD AUTO: 4.3 %
GLUCOSE SERPL-MCNC: 84 MG/DL
HCT VFR BLD CALC: 30.8 %
HGB BLD-MCNC: 11.1 G/DL
LYMPHOCYTES # BLD AUTO: 3.05 K/UL
LYMPHOCYTES NFR BLD AUTO: 52.1 %
MAN DIFF?: NORMAL
MCHC RBC-ENTMCNC: 31.6 PG
MCHC RBC-ENTMCNC: 36 GM/DL
MCV RBC AUTO: 87.7 FL
MONOCYTES # BLD AUTO: 0.4 K/UL
MONOCYTES NFR BLD AUTO: 6.8 %
NEUTROPHILS # BLD AUTO: 2.15 K/UL
NEUTROPHILS NFR BLD AUTO: 36.8 %
PLATELET # BLD AUTO: 394 K/UL
POTASSIUM SERPL-SCNC: 4.6 MMOL/L
RBC # BLD: 3.51 M/UL
RBC # FLD: 12.8 %
SODIUM SERPL-SCNC: 139 MMOL/L
WBC # FLD AUTO: 5.85 K/UL

## 2019-12-13 ENCOUNTER — APPOINTMENT (OUTPATIENT)
Dept: DERMATOLOGY | Facility: CLINIC | Age: 39
End: 2019-12-13
Payer: COMMERCIAL

## 2019-12-13 DIAGNOSIS — R21 RASH AND OTHER NONSPECIFIC SKIN ERUPTION: ICD-10-CM

## 2019-12-13 DIAGNOSIS — D48.5 NEOPLASM OF UNCERTAIN BEHAVIOR OF SKIN: ICD-10-CM

## 2019-12-13 PROCEDURE — 11104 PUNCH BX SKIN SINGLE LESION: CPT

## 2019-12-13 PROCEDURE — 99203 OFFICE O/P NEW LOW 30 MIN: CPT | Mod: 25

## 2019-12-13 RX ORDER — TRIAMCINOLONE ACETONIDE 1 MG/G
0.1 OINTMENT TOPICAL
Qty: 1 | Refills: 1 | Status: ACTIVE | COMMUNITY
Start: 2019-12-13 | End: 1900-01-01

## 2019-12-27 ENCOUNTER — APPOINTMENT (OUTPATIENT)
Dept: DERMATOLOGY | Facility: CLINIC | Age: 39
End: 2019-12-27
Payer: COMMERCIAL

## 2019-12-27 DIAGNOSIS — L81.7 PIGMENTED PURPURIC DERMATOSIS: ICD-10-CM

## 2019-12-27 DIAGNOSIS — L81.0 POSTINFLAMMATORY HYPERPIGMENTATION: ICD-10-CM

## 2019-12-27 LAB — CORE LAB BIOPSY: NORMAL

## 2019-12-27 PROCEDURE — 99213 OFFICE O/P EST LOW 20 MIN: CPT | Mod: 24

## 2020-02-17 DIAGNOSIS — Z00.00 ENCOUNTER FOR GENERAL ADULT MEDICAL EXAMINATION W/OUT ABNORMAL FINDINGS: ICD-10-CM

## 2020-05-18 ENCOUNTER — TRANSCRIPTION ENCOUNTER (OUTPATIENT)
Age: 40
End: 2020-05-18

## 2020-05-18 DIAGNOSIS — Z11.59 ENCOUNTER FOR SCREENING FOR OTHER VIRAL DISEASES: ICD-10-CM

## 2020-08-16 ENCOUNTER — RX RENEWAL (OUTPATIENT)
Age: 40
End: 2020-08-16

## 2020-09-28 NOTE — ED ADULT NURSE NOTE - DOES PATIENT HAVE ADVANCE DIRECTIVE
Additional Notes: Pt advised to moisturize lips due to dry and cracking skin.
No
Detail Level: Detailed

## 2020-11-16 ENCOUNTER — RX RENEWAL (OUTPATIENT)
Age: 40
End: 2020-11-16

## 2020-12-10 RX ORDER — METHYLPREDNISOLONE 4 MG/1
4 TABLET ORAL
Qty: 1 | Refills: 0 | Status: DISCONTINUED | COMMUNITY
Start: 2019-12-09 | End: 2020-12-10

## 2020-12-10 RX ORDER — DOXYCYCLINE HYCLATE 100 MG/1
100 CAPSULE ORAL
Qty: 14 | Refills: 0 | Status: DISCONTINUED | COMMUNITY
Start: 2019-12-09 | End: 2020-12-10

## 2020-12-21 PROBLEM — J06.9 ACUTE URI: Status: RESOLVED | Noted: 2019-06-17 | Resolved: 2020-12-21

## 2021-01-14 ENCOUNTER — EMERGENCY (EMERGENCY)
Facility: HOSPITAL | Age: 41
LOS: 0 days | Discharge: HOME | End: 2021-01-14
Attending: EMERGENCY MEDICINE
Payer: SELF-PAY

## 2021-01-14 VITALS
OXYGEN SATURATION: 98 % | TEMPERATURE: 99 F | RESPIRATION RATE: 18 BRPM | HEART RATE: 65 BPM | DIASTOLIC BLOOD PRESSURE: 58 MMHG | SYSTOLIC BLOOD PRESSURE: 110 MMHG

## 2021-01-14 VITALS — HEIGHT: 72 IN

## 2021-01-14 DIAGNOSIS — M16.0 BILATERAL PRIMARY OSTEOARTHRITIS OF HIP: ICD-10-CM

## 2021-01-14 DIAGNOSIS — Z91.013 ALLERGY TO SEAFOOD: ICD-10-CM

## 2021-01-14 DIAGNOSIS — Z98.891 HISTORY OF UTERINE SCAR FROM PREVIOUS SURGERY: Chronic | ICD-10-CM

## 2021-01-14 DIAGNOSIS — D57.1 SICKLE-CELL DISEASE WITHOUT CRISIS: ICD-10-CM

## 2021-01-14 DIAGNOSIS — R53.1 WEAKNESS: ICD-10-CM

## 2021-01-14 DIAGNOSIS — R20.0 ANESTHESIA OF SKIN: ICD-10-CM

## 2021-01-14 DIAGNOSIS — Z98.890 OTHER SPECIFIED POSTPROCEDURAL STATES: Chronic | ICD-10-CM

## 2021-01-14 DIAGNOSIS — Z79.3 LONG TERM (CURRENT) USE OF HORMONAL CONTRACEPTIVES: ICD-10-CM

## 2021-01-14 DIAGNOSIS — G45.9 TRANSIENT CEREBRAL ISCHEMIC ATTACK, UNSPECIFIED: ICD-10-CM

## 2021-01-14 DIAGNOSIS — R05 COUGH: ICD-10-CM

## 2021-01-14 DIAGNOSIS — H53.8 OTHER VISUAL DISTURBANCES: ICD-10-CM

## 2021-01-14 LAB
ALBUMIN SERPL ELPH-MCNC: 4.2 G/DL — SIGNIFICANT CHANGE UP (ref 3.3–5)
ALP SERPL-CCNC: 81 U/L — SIGNIFICANT CHANGE UP (ref 40–120)
ALT FLD-CCNC: 21 U/L — SIGNIFICANT CHANGE UP (ref 12–78)
ANION GAP SERPL CALC-SCNC: 8 MMOL/L — SIGNIFICANT CHANGE UP (ref 5–17)
APPEARANCE UR: CLEAR — SIGNIFICANT CHANGE UP
AST SERPL-CCNC: 25 U/L — SIGNIFICANT CHANGE UP (ref 15–37)
BASOPHILS # BLD AUTO: 0.04 K/UL — SIGNIFICANT CHANGE UP (ref 0–0.2)
BASOPHILS NFR BLD AUTO: 0.5 % — SIGNIFICANT CHANGE UP (ref 0–2)
BILIRUB SERPL-MCNC: 2.1 MG/DL — HIGH (ref 0.2–1.2)
BILIRUB UR-MCNC: NEGATIVE — SIGNIFICANT CHANGE UP
BUN SERPL-MCNC: 8 MG/DL — SIGNIFICANT CHANGE UP (ref 7–23)
CALCIUM SERPL-MCNC: 9.8 MG/DL — SIGNIFICANT CHANGE UP (ref 8.5–10.1)
CHLORIDE SERPL-SCNC: 104 MMOL/L — SIGNIFICANT CHANGE UP (ref 96–108)
CO2 SERPL-SCNC: 26 MMOL/L — SIGNIFICANT CHANGE UP (ref 22–31)
COLOR SPEC: YELLOW — SIGNIFICANT CHANGE UP
CREAT SERPL-MCNC: 0.71 MG/DL — SIGNIFICANT CHANGE UP (ref 0.5–1.3)
DIFF PNL FLD: ABNORMAL
EOSINOPHIL # BLD AUTO: 0.13 K/UL — SIGNIFICANT CHANGE UP (ref 0–0.5)
EOSINOPHIL NFR BLD AUTO: 1.5 % — SIGNIFICANT CHANGE UP (ref 0–6)
GLUCOSE SERPL-MCNC: 77 MG/DL — SIGNIFICANT CHANGE UP (ref 70–99)
GLUCOSE UR QL: NEGATIVE MG/DL — SIGNIFICANT CHANGE UP
HCT VFR BLD CALC: 27.9 % — LOW (ref 34.5–45)
HGB BLD-MCNC: 10.4 G/DL — LOW (ref 11.5–15.5)
IMM GRANULOCYTES NFR BLD AUTO: 0.4 % — SIGNIFICANT CHANGE UP (ref 0–1.5)
KETONES UR-MCNC: ABNORMAL
LEUKOCYTE ESTERASE UR-ACNC: ABNORMAL
LYMPHOCYTES # BLD AUTO: 3.76 K/UL — HIGH (ref 1–3.3)
LYMPHOCYTES # BLD AUTO: 44.6 % — HIGH (ref 13–44)
MAGNESIUM SERPL-MCNC: 2.3 MG/DL — SIGNIFICANT CHANGE UP (ref 1.6–2.6)
MCHC RBC-ENTMCNC: 31.5 PG — SIGNIFICANT CHANGE UP (ref 27–34)
MCHC RBC-ENTMCNC: 37.3 GM/DL — HIGH (ref 32–36)
MCV RBC AUTO: 84.5 FL — SIGNIFICANT CHANGE UP (ref 80–100)
MONOCYTES # BLD AUTO: 0.82 K/UL — SIGNIFICANT CHANGE UP (ref 0–0.9)
MONOCYTES NFR BLD AUTO: 9.7 % — SIGNIFICANT CHANGE UP (ref 2–14)
NEUTROPHILS # BLD AUTO: 3.65 K/UL — SIGNIFICANT CHANGE UP (ref 1.8–7.4)
NEUTROPHILS NFR BLD AUTO: 43.3 % — SIGNIFICANT CHANGE UP (ref 43–77)
NITRITE UR-MCNC: NEGATIVE — SIGNIFICANT CHANGE UP
NRBC # BLD: 1 /100 WBCS — HIGH (ref 0–0)
PH UR: 6 — SIGNIFICANT CHANGE UP (ref 5–8)
PLATELET # BLD AUTO: 323 K/UL — SIGNIFICANT CHANGE UP (ref 150–400)
POTASSIUM SERPL-MCNC: 3.6 MMOL/L — SIGNIFICANT CHANGE UP (ref 3.5–5.3)
POTASSIUM SERPL-SCNC: 3.6 MMOL/L — SIGNIFICANT CHANGE UP (ref 3.5–5.3)
PROT SERPL-MCNC: 8.7 GM/DL — HIGH (ref 6–8.3)
PROT UR-MCNC: 15 MG/DL
RBC # BLD: 3.3 M/UL — LOW (ref 3.8–5.2)
RBC # FLD: 13.6 % — SIGNIFICANT CHANGE UP (ref 10.3–14.5)
SODIUM SERPL-SCNC: 138 MMOL/L — SIGNIFICANT CHANGE UP (ref 135–145)
SP GR SPEC: 1.01 — SIGNIFICANT CHANGE UP (ref 1.01–1.02)
TSH SERPL-MCNC: 0.94 UU/ML — SIGNIFICANT CHANGE UP (ref 0.34–4.82)
UROBILINOGEN FLD QL: 1 MG/DL
WBC # BLD: 8.43 K/UL — SIGNIFICANT CHANGE UP (ref 3.8–10.5)
WBC # FLD AUTO: 8.43 K/UL — SIGNIFICANT CHANGE UP (ref 3.8–10.5)

## 2021-01-14 PROCEDURE — 85025 COMPLETE CBC W/AUTO DIFF WBC: CPT

## 2021-01-14 PROCEDURE — 87086 URINE CULTURE/COLONY COUNT: CPT

## 2021-01-14 PROCEDURE — 84443 ASSAY THYROID STIM HORMONE: CPT

## 2021-01-14 PROCEDURE — 93005 ELECTROCARDIOGRAM TRACING: CPT

## 2021-01-14 PROCEDURE — 80053 COMPREHEN METABOLIC PANEL: CPT

## 2021-01-14 PROCEDURE — 81025 URINE PREGNANCY TEST: CPT

## 2021-01-14 PROCEDURE — 70450 CT HEAD/BRAIN W/O DYE: CPT

## 2021-01-14 PROCEDURE — 99284 EMERGENCY DEPT VISIT MOD MDM: CPT | Mod: 25

## 2021-01-14 PROCEDURE — 36415 COLL VENOUS BLD VENIPUNCTURE: CPT

## 2021-01-14 PROCEDURE — 81001 URINALYSIS AUTO W/SCOPE: CPT

## 2021-01-14 PROCEDURE — 83735 ASSAY OF MAGNESIUM: CPT

## 2021-01-14 PROCEDURE — 71045 X-RAY EXAM CHEST 1 VIEW: CPT

## 2021-01-14 PROCEDURE — 70450 CT HEAD/BRAIN W/O DYE: CPT | Mod: 26

## 2021-01-14 PROCEDURE — 99285 EMERGENCY DEPT VISIT HI MDM: CPT

## 2021-01-14 PROCEDURE — 71045 X-RAY EXAM CHEST 1 VIEW: CPT | Mod: 26

## 2021-01-14 PROCEDURE — 93010 ELECTROCARDIOGRAM REPORT: CPT

## 2021-01-14 NOTE — ED PROVIDER NOTE - CARE PLAN
Principal Discharge DX:	TIA (transient ischemic attack)  Secondary Diagnosis:	Sickle cell disease without crisis

## 2021-01-14 NOTE — ED PROVIDER NOTE - PATIENT PORTAL LINK FT
You can access the FollowMyHealth Patient Portal offered by [facility] by registering at the following website: [email or web address]. By joining [employer]’s FollowMyHealth portal, you will also be able to view your health information using other applications (apps) compatible with our system.

## 2021-01-14 NOTE — ED ADULT NURSE NOTE - OBJECTIVE STATEMENT
39 y/o F presents to the ED with /o having neurological symptoms last night at home while talking. Pt states she began to have blurred vision and see black and c/o left sided weakness that lasted a few minutes. Pt states symptoms resolved, no symptoms today. Pt denies sob, chest pain.

## 2021-01-14 NOTE — ED ADULT TRIAGE NOTE - CHIEF COMPLAINT QUOTE
pt a/ox3, c/o "I need a neurological evaluation after my neuro symptoms yesterday, yesterday at like 10:15pm I had like vision changes it went black and then I had weakness and I felt numb." pt reports symptoms have resolved.

## 2021-01-14 NOTE — ED PROVIDER NOTE - CARE PROVIDER_API CALL
René Hein  INTERNAL MEDICINE  [address] [place] [address], [address] [place] [address]  Phone: [phone]  Fax: [phone]  Follow Up Time:     Paty Bautista  NEUROLOGY - GENERAL  [address] [place] [address], [address] [place] [address]  Phone: [phone]  Fax: [phone]  Follow Up Time:     Tina Harrison)  Clinical Neurophysiology; EEGEpilepsy; Neurology; Sleep Medicine  [address] [place] [address], [address] [place] [address]  Phone: [phone]  Fax: [phone]  Follow Up Time:

## 2021-01-14 NOTE — ED PROVIDER NOTE - PROVIDER TOKENS
PROVIDER:[TOKEN:[[id number]]],PROVIDER:[TOKEN:[[id number]]],PROVIDER:[TOKEN:[[id number]]]

## 2021-01-14 NOTE — ED PROVIDER NOTE - OBJECTIVE STATEMENT
41 y/o female with PMHx of Sickle cell disease, renal cyst, gallstone, OA presents to the ED for evaluation. Pt states "I saw a black cloud in both eyes and had blurry vision." Pt reports she had left sided numbness yesterday followed by weakness. Pt also reports sensation of loss of balance with no change in gait, now resolved. Pt states symptoms suddenly started around 10:15pm last night and gradually symptoms resolved after 2 hours. Pt reports currently she feels back to her baseline. Denies HA, numbness, weakness, tingling, fevers, chills, CP, abd pain, urinary symptoms. On birth control. No recent travel. No other complaints at this time. PCP: Tete. SICKLE CELL: Helga.

## 2021-01-14 NOTE — ED STATDOCS - PROGRESS NOTE DETAILS
Lauro Whitehead for attending Dr. Sim: 39 y/o female with a PMHx of acute UTI with sepsis 8/27/19, gallstones, hepatomegaly, osteoarthritis, renal cyst, sick cell disease, presents to the ED for evaluation. Pt states "I saw a black cloud in both eyes and had blurry vision." Pt reports she had left sided numbness yesterday followed by weakness. Pt states symptoms started around 10:15pm last night and gradually symptoms resolved. Pt reports currently she feels back to her baseline. Denies HA, numbness, weakness, tingling, fevers, chills, CP, abd pain, urinary symptoms. On birth control. No recent travel. No other complaints at this time. Will send pt to main ED for further evaluation.

## 2021-01-14 NOTE — ED PROVIDER NOTE - CLINICAL SUMMARY MEDICAL DECISION MAKING FREE TEXT BOX
41 y/o female with PMHx of Sickle cell disease, renal cyst, gallstone, OA presenting ambulatory to ED for neuro evaluation s/p episode yesterday evening with visual changes, left sided numbness and weakness, and subjective imbalance. Pt reports all symptoms resolved within 2 hours with no recurrence. Pt with 2 months of mild cough. No other COVID type symptoms. Pt denies symptoms consistent with typical sickle cell flare up. Currently pt in no acute distress, NIHSS: 0. PLAN: EKG, CXR, CT head, labs including urine, monitor, observe, reassess.

## 2021-01-14 NOTE — ED STATDOCS - NS_ ATTENDINGSCRIBEDETAILS _ED_A_ED_FT
I Ian Sim MD saw and examined the patient. Scribe documented for me and under my supervision. I have modified the scribe's documentation where necessary to reflect my history, physical exam and other relevant documentations pertinent to the care of the patient.

## 2021-01-14 NOTE — ED ADULT NURSE REASSESSMENT NOTE - NS ED NURSE REASSESS COMMENT FT1
Assumed care of patient from BERTHA Silva.  Patient resting comfortably in stretcher.  Patient awaiting blood work results at this time.  Safety maintained.

## 2021-01-14 NOTE — ED ADULT NURSE NOTE - DISCHARGE DATE/TIME
Telephone Encounter by Marcie Downs at 07/23/18 03:41 PM     Author:  Marcie Downs Service:  (none) Author Type:  Trainer     Filed:  07/23/18 03:44 PM Encounter Date:  7/23/2018 Status:  Signed     :  Marcie Downs (Trainer)            Referral was approved and printed and faxed to [facility]. Attempted to contacted a parent of Nasir but they were unavailable at the time of the call. Left a message with Nasir to have his parent call us back.     PSR: please route to Dr. Alejandro team to help with finding a timely appointment for MRI follow-up.[AF1.1M]           Revision History        User Key Date/Time User Provider Type Action    > AF1.1 07/23/18 03:44 PM Marcie Downs Trainer Sign    M - Manual             14-Jan-2021 23:40

## 2021-01-14 NOTE — ED STATDOCS - PMH
Acute UTI  with sepsis 8/27/19  Gallstone    Hepatomegaly    Osteoarthritis of both hips, unspecified osteoarthritis type  with h/o osteonecrosis of b/l hips at 6 months old  Renal cyst    Sickle cell disease

## 2021-01-14 NOTE — ED PROVIDER NOTE - PROGRESS NOTE DETAILS
Dr. Crespo:  CBC still pending.  Case d/w Neuro, Dr. Hein, who agrees with baby ASA & D/C for outpt f/y if H/H w/o major change. Dr. Crespo:  CBC still pending.  I accessed [email or web address] re: TIA in sickle cell pts: no clear consensus re: ASA use or other meds.  Case d/w Neuro, Dr. Hein, who agrees with baby ASA & D/C for outpt f/y if H/H w/o major change. Dr. Crespo:  I called Lab for CBC result: Hgb = 10.4.  Will D/C pt on baby ASA for outpt neuro f/u.

## 2021-01-14 NOTE — ED PROVIDER NOTE - CARE PROVIDERS DIRECT ADDRESSES
[email or web address]

## 2021-01-14 NOTE — ED PROVIDER NOTE - NSFOLLOWUPINSTRUCTIONS_ED_ALL_ED_FT
Aspirin 81 mg 1 tab daily.  Continue your own medications as per routine.  Drink plenty of oral fluids, stay well hydrated.  Follow up next week Neurology as listed below.  Follow up next week with your hematologist/sickle cell doctor.      ED evaluation and management discussed with the patient and family (if available) in detail.  Close PMD follow up encouraged.  Strict ED return instructions discussed in detail and patient given the opportunity to ask any questions about their discharge diagnosis and instructions. Patient verbalized understanding.          Transient Ischemic Attack    WHAT YOU NEED TO KNOW:    A transient ischemic attack (TIA), or mini-stroke, happens when blood cannot flow to part of the brain. A TIA only lasts minutes to hours and does not cause lasting damage. It is still important to get immediate medical care. A TIA may be a warning that you are about to have an ischemic stroke. An ischemic stroke happens when blood flow to the brain is suddenly blocked, usually by a blood clot.    Ischemic Stroke         DISCHARGE INSTRUCTIONS:    Call your local emergency number (911 in the ) or have someone else call if:   •You have any of the following signs of a stroke: ?Numbness or drooping on one side of your face       ?Weakness in an arm or leg      ?Confusion or difficulty speaking      ?Dizziness, a severe headache, or vision loss    BE FAST SIGNS OF A STROKE         •You have a seizure.      •You have chest pain or shortness of breath.      •You cough up blood.      Return to the emergency department if:   •Your arm or leg feels warm, tender, and painful. It may look swollen and red.      •You have unusual or heavy bleeding.      •You have a severe headache or feel dizzy.      Call your doctor or neurologist if:   •Your blood pressure or blood sugar level is higher or lower than you were told it should be.      •You have questions or concerns about your condition or care.      Warning signs of a stroke: The words BE FAST can help you remember and recognize warning signs of a stroke:  •B = Balance: Sudden loss of balance      •E = Eyes: Loss of vision in one or both eyes      •F = Face: Face droops on one side      •A = Arms: Arm drops when both arms are raised      •S = Speech: Speech is slurred or sounds different      •T = Time: Time to get help immediately    BE FAST SIGNS OF A STROKE         Medicines: You may need any of the following:   •Antiplatelets, such as aspirin, help prevent blood clots. Take your antiplatelet medicine exactly as directed. These medicines make it more likely for you to bleed or bruise. If you are told to take aspirin, do not take acetaminophen or ibuprofen instead.       •Blood thinners help prevent blood clots. Clots can cause strokes, heart attacks, and death. The following are general safety guidelines to follow while you are taking a blood thinner:?Watch for bleeding and bruising while you take blood thinners. Watch for bleeding from your gums or nose. Watch for blood in your urine and bowel movements. Use a soft washcloth on your skin, and a soft toothbrush to brush your teeth. This can keep your skin and gums from bleeding. If you shave, use an electric shaver. Do not play contact sports.       ?Tell your dentist and other healthcare providers that you take a blood thinner. Wear a bracelet or necklace that says you take this medicine.       ?Do not start or stop any other medicines unless your healthcare provider tells you to. Many medicines cannot be used with blood thinners.      ?Take your blood thinner exactly as prescribed by your healthcare provider. Do not skip does or take less than prescribed. Tell your provider right away if you forget to take your blood thinner, or if you take too much.      ?Warfarin is a blood thinner that you may need to take. The following are things you should be aware of if you take warfarin: ?Foods and medicines can affect the amount of warfarin in your blood. Do not make major changes to your diet while you take warfarin. Warfarin works best when you eat about the same amount of vitamin K every day. Vitamin K is found in green leafy vegetables and certain other foods. Ask for more information about what to eat when you are taking warfarin.      ?You will need to see your healthcare provider for follow-up visits when you are on warfarin. You will need regular blood tests. These tests are used to decide how much medicine you need.         •Other medicines may be needed to treat diabetes, depression, high cholesterol, or blood pressure problems. You may also need medicine to decrease the pressure in your brain, reduce pain, or prevent seizures.      •Take your medicine as directed. Contact your healthcare provider if you think your medicine is not helping or if you have side effects. Tell him of her if you are allergic to any medicine. Keep a list of the medicines, vitamins, and herbs you take. Include the amounts, and when and why you take them. Bring the list or the pill bottles to follow-up visits. Carry your medicine list with you in case of an emergency.      What you can do to prevent another TIA or a stroke:   •Manage health conditions. A condition such as diabetes can increase your risk for a stroke. Control your blood sugar level if you have hyperglycemia or diabetes. Take your prescribed medicines and check your blood sugar level as directed.  How to check your blood sugar           •Check your blood pressure as directed. High blood pressure can increase your risk for a stroke. If you have high blood pressure, follow your healthcare provider’s directions for controlling your blood pressure.   How to take a Blood Pressure           •Do not use nicotine products or illegal drugs. Nicotine and other chemicals in cigarettes and cigars can cause blood vessel damage. Nicotine and illegal drugs both increase your risk for a stroke. Ask your healthcare provider for information if you currently smoke or use drugs and need help to quit. E- cigarettes or smokeless tobacco still contain nicotine. Talk to your healthcare provider before you use these products.      •Talk to your healthcare provider about alcohol. Alcohol can raise your blood pressure. The recommended limit is 2 drinks in a day for men and 1 drink in a day for women. Do not binge drink or save a week's worth of alcohol to drink in 1 or 2 days. Limit weekly amounts as directed by your provider.      •Eat a variety of healthy foods. Healthy foods include whole-grain breads, low-fat dairy products, beans, lean meats, and fish. Eat at least 5 servings of fruits and vegetables each day. Choose foods that are low in fat, cholesterol, salt, and sugar. Eat foods that are high in potassium, such as potatoes and bananas. A dietitian can help you create healthy meal plans.   Healthy Foods           •Maintain a healthy weight. Ask your healthcare provider how much you should weigh. Ask him or her to help you create a weight loss plan if you are overweight. He or she can help you create small goals if you have a lot of weight to lose.      •Exercise as directed. Exercise can lower your blood pressure, cholesterol, weight, and blood sugar levels. Healthcare providers will help you create exercise goals. They can also help you make a plan to reach your goals. For example, you can break exercise into 10 minute periods, 3 times in the day. Find an exercise that you enjoy. This will make it easier for you to reach your exercise goals.  Walking for Exercise           •Manage stress. Stress can raise your blood pressure. Find ways to relax, such as deep breathing or listening to music.      Follow up with your doctor or neurologist in 1 to 2 days: Write down your questions so you remember to ask them during your visits.          Sickle Cell Disease    AMBULATORY CARE:    Sickle cell disease (SCD) causes your RBCs to be sickle (crescent) shaped. The sickle shape is caused by abnormal hemoglobin attached to the RBC. Hemoglobin carries oxygen to all tissues in your body. Sickle-shaped RBCs can get stuck to the walls of blood vessels. This can stop or slow blood flow, and prevent oxygen from getting to tissues. When this happens, it is called a sickle cell crisis. SCD may also cause low red blood cell (RBC) levels (anemia).     Common symptoms include the following: The following symptoms may come and go, or may happen during a sickle cell crisis:   •Low energy      •Pain anywhere in your body      •Pale skin      •Headaches      •Shortness of breath      Call your local emergency number (911 in the ) or have someone else call if:   •You have any of the following signs of a stroke: ?Numbness or drooping on one side of your face       ?Weakness in an arm or leg      ?Confusion or difficulty speaking      ?Dizziness, a severe headache, or vision loss      •You have any of the following signs of a heart attack: ?Squeezing, pressure, or pain in your chest      ?You may also have any of the following: ?Discomfort or pain in your back, neck, jaw, stomach, or arm      ?Shortness of breath      ?Nausea or vomiting      ?Lightheadedness or a sudden cold sweat        •You have a seizure.       •You lose vision in one or both eyes.      •You lose consciousness or cannot be woken.       Seek care immediately if:   •You feel lightheaded, short of breath, and have chest pain.      •You cough up blood.      •You have a fever of 100.4°F (38°C) or higher.      •You have a severe headache.       •Your pain does not get better after you take pain medicine.      •Your arm or leg is painful, red, and larger than usual.       •You feel like you can no longer cope with your pain, or feel like harming yourself.       •You have abdominal pain, nausea, and vomiting.      •You are a man and have an erection that is painful and does not go away after 4 hours.       •You cannot think clearly, or you feel like you are going to faint.      Call your doctor if:   •Your urine is dark, or you are urinating less than usual or not at all.       •You see blood in your urine.       •Your eyes or skin are yellow.       •You have a cold or the flu.       •You have a cough or are wheezing.       •You are more tired than usual during the day.      •You are constipated or have diarrhea.      •Your vision has changed in one or both eyes.       •You feel anxious or depressed.       •You have new or worse swelling in your joints.       •You have an open sore on your skin that will not heal.       •You are pregnant or think you are pregnant.       •You have questions or concerns about your condition or care.      Treatment for sickle cell anemia may include any of the following:   •Medicines may be given to decrease pain or sickling of your RBCs. You may also need medicine to treat or prevent a bacterial infection.       •A blood transfusion increases the number of healthy RBCs in your blood.       •Surgery may be done to remove your spleen or gallbladder. Surgery may be needed if RBCs often get stuck in your spleen, or you have gallstones.       •A stem cell transplant, also called a bone marrow transplant, helps your body make new, healthy blood cells.       Self-care:   •Apply heat to areas of pain. Use a heating pad or take a warm bath. Do this for 20 to 30 minutes every 2 hours for as many days as directed.       •Gently massage areas where you feel pain. A professional massage may also help with pain.       •Balance rest and exercise. Rest during a sickle cell crisis. Over time, increase your activity. Exercise may help decrease pain. Take breaks during exercise and drink plenty of water. Ask your healthcare provider which activities are safe for you.       •Get acupuncture treatment. Acupuncture may help decrease pain and help you relax. Ask your healthcare provider for more information about acupuncture.       •Eat healthy foods. Healthy foods will improve your overall health and make it easier to manage SCD. Examples of healthy foods include fruits, vegetables, whole-grain breads, low-fat dairy products, beans, lean meats, and fish. Ask if you need to be on a special diet.       Prevent a sickle cell crisis: A sickle cell crisis may be caused by illness, changes in temperature, stress, dehydration, or being at high altitudes. Do the following to help prevent a sickle cell crisis:   •Drink liquids as directed. Dehydration can increase your risk for a sickle cell crisis. Ask how much liquid to drink each day and which liquids are best for you.      •Avoid quick changes in temperature. Do not go quickly from a warm place to a cold place. Get in a pool slowly instead of jumping in. Avoid getting too hot or too cold. Dress in light clothing in the summer and warm clothing in the winter.       •Ask about vaccinations. Vaccinations can help prevent a viral infection. Get a flu shot every year as directed. You may also need a pneumonia vaccine.      •Wash your hands frequently. Handwashing can help prevent illness. Wash your hands before you prepare or eat food, and after you use the bathroom.   Handwashing           •Do not smoke. Nicotine and other chemicals in cigarettes and cigars can cause lung damage and sickle cell crisis. Ask your healthcare provider for information if you currently smoke and need help to quit. E-cigarettes or smokeless tobacco still contain nicotine. Talk to your healthcare provider before you use these products.      •Limit or do not drink alcohol. Alcohol can cause dehydration and increase your risk for sickle cell crisis. If you drink alcohol, also drink plenty of water.       •Manage your stress. Your healthcare provider may recommend relaxation techniques and deep breathing exercises to help decrease your stress. Your healthcare provider may recommend you talk to someone about your stress or anxiety, such as a counselor or a trusted friend.       •Do not travel in an unpressurized plane or travel to high altitudes. These environments are low in oxygen and may cause a sickle cell crisis.       Wear medical alert identification: Wear medical alert jewelry or carry a card that says you have sickle cell anemia. Ask your healthcare provider where to get these items.     Medical Alert Jewelry         What you need to know about family planning and pregnancy:   •If you do not want to become pregnant, your healthcare provider may recommend birth control pills that contain only progestin. The pills will prevent pregnancy and make your periods lighter. Lighter periods may help treat low RBC levels.       •Talk to your healthcare provider before you get pregnant. SCD increases a woman's risk for problems, such as a miscarriage and having a baby that weighs less than normal. You will need close monitoring during pregnancy. You may need to take certain vitamins and have 1 or more blood transfusions during pregnancy.       •You will pass a gene for sickle cell disease to your child. Your partner should be tested for the sickle cell gene. This information can help predict your child's risk for sickle cell disease.       Follow up with your doctor as directed: You may need ongoing screening for conditions that can develop because of sickle cell disease. Examples include kidney disease, hypertension (high blood pressure), retinopathy (eye problems), and problems with your lungs. Write down your questions so you remember to ask them during your visits.

## 2021-01-15 LAB
CULTURE RESULTS: SIGNIFICANT CHANGE UP
SPECIMEN SOURCE: SIGNIFICANT CHANGE UP

## 2021-01-29 ENCOUNTER — APPOINTMENT (OUTPATIENT)
Dept: OBGYN | Facility: CLINIC | Age: 41
End: 2021-01-29

## 2021-05-01 NOTE — DISCHARGE NOTE PROVIDER - NSDCCPCAREPLAN_GEN_ALL_CORE_FT
38
PRINCIPAL DISCHARGE DIAGNOSIS  Diagnosis: Pyelonephritis  Assessment and Plan of Treatment: severe Ecoli sepsis secondary jamariley pyelonephritis  continue Ceftin BID X 10 more days  FU repeat blood cultures PENDING        SECONDARY DISCHARGE DIAGNOSES  Diagnosis: Renal cyst  Assessment and Plan of Treatment: monitor as outpatient

## 2021-09-03 NOTE — ED PROVIDER NOTE - CHIEF COMPLAINT
The patient is a 37y Female complaining of urinary symptoms. Writer spoke to pt and informed her of results. Pt verbalized her understanding and had no further questions.

## 2021-12-06 ENCOUNTER — NON-APPOINTMENT (OUTPATIENT)
Age: 41
End: 2021-12-06

## 2022-11-18 NOTE — H&P ADULT - NSICDXFAMILYHX_GEN_ALL_CORE_FT
FAMILY HISTORY:  Family history of high cholesterol, in mother, alive  Family history of hypertension in father, and parkinsons and CHF, 
Name band;

## 2023-02-20 NOTE — ED ADULT NURSE NOTE - CAS DISCH TRANSFER METHOD
Alert and oriented x4. Vital signs stable on room air. SBA. Tolerating regular diet. Lung sounds clear. Bowel sounds active, passing flatus, BM during shift, adequate urine output. R hip hematoma, border outlined, abrasion R forearm CDI. Pain managed with scheduled tylenol. Denies nausea. Tele SR. PIV SL.    Pt discharged to home approx 1115 via personal ride. All questions and concerns answered and addressed. All belongings sent with patient. Home medications returned to pt from pharmacy.      Private car

## 2024-10-03 NOTE — PATIENT PROFILE ADULT - HAS THE PATIENT EXPERIENCED ANY OF THE FOLLOWING WITHIN THE WEEK PRIOR TO ADMISSION?
You are able to breathe well through your nose and this is a good thing, recommend that you increase oral fluid intake you can take ibuprofen or Tylenol for pain.  Recommend avoiding contact sports for the next 3 days, if your symptoms worsen with activity recommend that you decrease activity and rest.  You develop increased swelling and unable to breathe through your nose recommend that you are seen by ear nose and throat provider or being seen here in the emergency department.  No fractures or bone abnormalities identified on x-rays today.  Recommend icing treating with an anti-inflammatory such as ibuprofen as needed.  
no

## 2024-12-06 NOTE — PATIENT PROFILE ADULT - NSPROPOAPRESSUREINJURY_GEN_A_NUR
Patient identified by name and   Patient had a vaginal delivery on 2024  Patients BP was 152/93, MD notified  New orders to send patient to L&D for evaluation  Patient denies/ has c/o) any headache, lightheadedness, dizziness, blurred vision, or pain in the upper right quadrant.   Patient continues to take medication as prescribed  Patient is breastfeeding  Patient denies any symptoms of depression, SI or HI  Patient appears well and overall in a good condition  Patient encouraged to continue to take  her medications as ordered  Patient also informed that she should schedule her PPV  Patient verbalized understanding     no